# Patient Record
Sex: MALE | Race: WHITE | NOT HISPANIC OR LATINO | Employment: FULL TIME
[De-identification: names, ages, dates, MRNs, and addresses within clinical notes are randomized per-mention and may not be internally consistent; named-entity substitution may affect disease eponyms.]

---

## 2018-03-05 ENCOUNTER — TRANSCRIBE ORDERS (OUTPATIENT)
Dept: SCHEDULING | Age: 48
End: 2018-03-05

## 2018-03-05 DIAGNOSIS — M25.512 CHRONIC LEFT SHOULDER PAIN: Primary | ICD-10-CM

## 2018-03-05 DIAGNOSIS — G89.29 CHRONIC LEFT SHOULDER PAIN: Primary | ICD-10-CM

## 2018-03-09 ENCOUNTER — TELEPHONE (OUTPATIENT)
Dept: FAMILY MEDICINE | Facility: CLINIC | Age: 48
End: 2018-03-09

## 2018-03-09 RX ORDER — PENICILLIN V POTASSIUM 250 MG/1
250 TABLET, FILM COATED ORAL 3 TIMES DAILY
Qty: 30 TABLET | Refills: 0 | Status: SHIPPED | OUTPATIENT
Start: 2018-03-09 | End: 2018-03-19

## 2018-03-09 NOTE — TELEPHONE ENCOUNTER
Frederic has not been feeling well for the past couple of days. His daughter was just diagnosed with strep and he is wondering if he should begin an antibiotic as well. Could Dr. Frankel send in rx for him?

## 2019-05-28 ENCOUNTER — OFFICE VISIT (OUTPATIENT)
Dept: FAMILY MEDICINE | Facility: CLINIC | Age: 49
End: 2019-05-28
Payer: COMMERCIAL

## 2019-05-28 VITALS
WEIGHT: 171.4 LBS | OXYGEN SATURATION: 98 % | BODY MASS INDEX: 25.39 KG/M2 | HEART RATE: 66 BPM | DIASTOLIC BLOOD PRESSURE: 61 MMHG | SYSTOLIC BLOOD PRESSURE: 97 MMHG | TEMPERATURE: 98.3 F | HEIGHT: 69 IN

## 2019-05-28 DIAGNOSIS — Z11.59 MEASLES SCREENING: ICD-10-CM

## 2019-05-28 DIAGNOSIS — Z00.00 ENCOUNTER FOR PREVENTIVE CARE: Primary | ICD-10-CM

## 2019-05-28 PROCEDURE — 36415 COLL VENOUS BLD VENIPUNCTURE: CPT | Performed by: FAMILY MEDICINE

## 2019-05-28 PROCEDURE — 99396 PREV VISIT EST AGE 40-64: CPT | Mod: 25 | Performed by: FAMILY MEDICINE

## 2019-05-28 ASSESSMENT — ENCOUNTER SYMPTOMS
DIARRHEA: 0
NERVOUS/ANXIOUS: 0
ARTHRALGIAS: 0
FATIGUE: 0
BACK PAIN: 0
POLYDIPSIA: 0
HEADACHES: 0
CONSTIPATION: 0
BRUISES/BLEEDS EASILY: 0
POLYPHAGIA: 0
SHORTNESS OF BREATH: 0

## 2019-05-28 NOTE — PROGRESS NOTES
[place] [address]  [phone]       Reason for visit:   Chief Complaint   Patient presents with   • Annual Exam      HPI   Frederic Preciado is a 49 y.o. male who presents for CPE. He is well and without complaints. He continues to have hip pain.      History reviewed. No pertinent past medical history.  History reviewed. No pertinent surgical history.  Social History     Social History   • Marital status:      Spouse name: N/A   • Number of children: N/A   • Years of education: N/A     Occupational History   • Not on file.     Social History Main Topics   • Smoking status: Never Smoker   • Smokeless tobacco: Never Used   • Alcohol use Yes      Comment: socially   • Drug use: Unknown   • Sexual activity: Not on file     Other Topics Concern   • Not on file     Social History Narrative    Do you wear your seatbelt? Yes    Do you have smoke detector in your home? Yes    Do you have a carbon monoxide detector in your home? 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          Family History   Problem Relation Age of Onset   • Stroke Mother    • Atrial fibrillation Father      No known allergies  No current outpatient prescriptions on file.     No current facility-administered medications for this visit.        Patient Active Problem List    Diagnosis Date Noted   • Encounter for preventive care 05/28/2019   • Measles screening 05/28/2019         Review of Systems   Constitutional: Negative for fatigue.   HENT: Negative for hearing loss.    Eyes: Negative for visual disturbance.   Respiratory: Negative for shortness of breath.    Cardiovascular: Negative for chest pain.   Gastrointestinal: Negative for constipation and diarrhea.   Endocrine: Negative for polydipsia, polyphagia and polyuria.   Musculoskeletal: Negative for arthralgias and back pain.   Neurological: Negative for headaches.   Hematological: Does not  "bruise/bleed easily.   Psychiatric/Behavioral: The patient is not nervous/anxious.      Objective   Vitals:    05/28/19 0958   BP: 97/61   BP Location: Right upper arm   Patient Position: Sitting   Pulse: 66   Temp: 36.8 °C (98.3 °F)   TempSrc: Oral   SpO2: 98%   Weight: 77.7 kg (171 lb 6.4 oz)   Height: 1.753 m (5' 9\")     Body mass index is 25.31 kg/m².  Physical Exam   Constitutional: He is oriented to person, place, and time. He appears well-developed and well-nourished.   HENT:   Right Ear: Tympanic membrane normal.   Left Ear: Tympanic membrane normal.   Mouth/Throat: Oropharynx is clear and moist.   Eyes: Pupils are equal, round, and reactive to light. Conjunctivae are normal.   Neck: No thyromegaly present.   Cardiovascular: Normal rate, regular rhythm, normal heart sounds and intact distal pulses.    No murmur heard.  Pulmonary/Chest: Effort normal and breath sounds normal.   Abdominal: Soft. Bowel sounds are normal. There is no hepatosplenomegaly. No hernia. Hernia confirmed negative in the right inguinal area.   Genitourinary: Testes normal.   Musculoskeletal: Normal range of motion. He exhibits no edema.   Lymphadenopathy:     He has no cervical adenopathy.   Neurological: He is alert and oriented to person, place, and time. He displays normal reflexes.   Skin: Skin is warm and dry.   Psychiatric: He has a normal mood and affect. His behavior is normal. Judgment and thought content normal.       Procedures    Lab Results   Component Value Date    WBC 4.3 02/19/2018    HGB 12.8 (L) 02/19/2018    HCT 38.8 02/19/2018     02/19/2018    CHOL 187 02/19/2018    TRIG 83 02/19/2018    HDL 57 02/19/2018    LDLCALC 113 (H) 02/19/2018    ALT 13 02/19/2018    AST 16 02/19/2018     02/19/2018    K 4.4 02/19/2018     02/19/2018    CREATININE 0.94 02/19/2018    BUN 14 02/19/2018    CO2 27 02/19/2018    GLUCOSE 100 (H) 02/19/2018    HGBA1C 5.4 02/19/2018           Assessment   Problem List Items " Addressed This Visit     Encounter for preventive care - Primary     Labs  Colonoscopy with Dr Rose         Relevant Orders    CBC and Differential    Comprehensive metabolic panel    Lipid panel    PSA    Measles screening    Relevant Orders    Measles antibody, IgG              Harry A. Frankel, MD  5/28/2019

## 2019-05-29 LAB
ALBUMIN SERPL-MCNC: 4.4 G/DL (ref 3.5–5.5)
ALBUMIN/GLOB SERPL: 2.1 {RATIO} (ref 1.2–2.2)
ALP SERPL-CCNC: 59 IU/L (ref 39–117)
ALT SERPL-CCNC: 16 IU/L (ref 0–44)
AST SERPL-CCNC: 38 IU/L (ref 0–40)
BASOPHILS # BLD AUTO: 0 X10E3/UL (ref 0–0.2)
BASOPHILS NFR BLD AUTO: 1 %
BILIRUB SERPL-MCNC: 0.6 MG/DL (ref 0–1.2)
BUN SERPL-MCNC: 13 MG/DL (ref 6–24)
BUN/CREAT SERPL: 15 (ref 9–20)
CALCIUM SERPL-MCNC: 9.3 MG/DL (ref 8.7–10.2)
CHLORIDE SERPL-SCNC: 100 MMOL/L (ref 96–106)
CHOLEST SERPL-MCNC: 178 MG/DL (ref 100–199)
CO2 SERPL-SCNC: 26 MMOL/L (ref 20–29)
CREAT SERPL-MCNC: 0.87 MG/DL (ref 0.76–1.27)
EOSINOPHIL # BLD AUTO: 0.3 X10E3/UL (ref 0–0.4)
EOSINOPHIL NFR BLD AUTO: 8 %
ERYTHROCYTE [DISTWIDTH] IN BLOOD BY AUTOMATED COUNT: 14.6 % (ref 12.3–15.4)
GLOBULIN SER CALC-MCNC: 2.1 G/DL (ref 1.5–4.5)
GLUCOSE SERPL-MCNC: 107 MG/DL (ref 65–99)
HCT VFR BLD AUTO: 37.4 % (ref 37.5–51)
HDLC SERPL-MCNC: 56 MG/DL
HGB BLD-MCNC: 12.3 G/DL (ref 13–17.7)
IMM GRANULOCYTES # BLD AUTO: 0 X10E3/UL (ref 0–0.1)
IMM GRANULOCYTES NFR BLD AUTO: 0 %
LAB CORP EGFR IF AFRICN AM: 116 ML/MIN/1.73
LAB CORP EGFR IF NONAFRICN AM: 101 ML/MIN/1.73
LDLC SERPL CALC-MCNC: 102 MG/DL (ref 0–99)
LYMPHOCYTES # BLD AUTO: 1.6 X10E3/UL (ref 0.7–3.1)
LYMPHOCYTES NFR BLD AUTO: 38 %
MCH RBC QN AUTO: 28.6 PG (ref 26.6–33)
MCHC RBC AUTO-ENTMCNC: 32.9 G/DL (ref 31.5–35.7)
MCV RBC AUTO: 87 FL (ref 79–97)
MEV IGG SER IA-ACNC: >300 AU/ML
MONOCYTES # BLD AUTO: 0.3 X10E3/UL (ref 0.1–0.9)
MONOCYTES NFR BLD AUTO: 7 %
NEUTROPHILS # BLD AUTO: 1.9 X10E3/UL (ref 1.4–7)
NEUTROPHILS NFR BLD AUTO: 46 %
PLATELET # BLD AUTO: 210 X10E3/UL (ref 150–450)
POTASSIUM SERPL-SCNC: 4.2 MMOL/L (ref 3.5–5.2)
PROT SERPL-MCNC: 6.5 G/DL (ref 6–8.5)
PSA SERPL-MCNC: 0.9 NG/ML (ref 0–4)
RBC # BLD AUTO: 4.3 X10E6/UL (ref 4.14–5.8)
SODIUM SERPL-SCNC: 138 MMOL/L (ref 134–144)
TRIGL SERPL-MCNC: 102 MG/DL (ref 0–149)
VLDLC SERPL CALC-MCNC: 20 MG/DL (ref 5–40)
WBC # BLD AUTO: 4.2 X10E3/UL (ref 3.4–10.8)

## 2019-10-14 ENCOUNTER — TELEPHONE (OUTPATIENT)
Dept: FAMILY MEDICINE | Facility: CLINIC | Age: 49
End: 2019-10-14

## 2019-10-14 RX ORDER — METHYLPREDNISOLONE 4 MG/1
TABLET ORAL
Qty: 21 TABLET | Refills: 0 | Status: SHIPPED | OUTPATIENT
Start: 2019-10-14 | End: 2019-10-21

## 2019-10-14 NOTE — TELEPHONE ENCOUNTER
Threw his back out (he has done that before.) He is leaving for a business trip tomorrow and was hoping you could call in a medrol dose zahra. Let me know when done I can call him.

## 2020-12-11 ENCOUNTER — OFFICE VISIT (OUTPATIENT)
Dept: FAMILY MEDICINE | Facility: CLINIC | Age: 50
End: 2020-12-11
Payer: COMMERCIAL

## 2020-12-11 VITALS
TEMPERATURE: 96.2 F | WEIGHT: 170.6 LBS | HEART RATE: 65 BPM | OXYGEN SATURATION: 98 % | BODY MASS INDEX: 25.27 KG/M2 | SYSTOLIC BLOOD PRESSURE: 127 MMHG | DIASTOLIC BLOOD PRESSURE: 68 MMHG | HEIGHT: 69 IN

## 2020-12-11 DIAGNOSIS — Z00.00 ENCOUNTER FOR PREVENTIVE CARE: Primary | ICD-10-CM

## 2020-12-11 PROCEDURE — 36415 COLL VENOUS BLD VENIPUNCTURE: CPT | Performed by: FAMILY MEDICINE

## 2020-12-11 PROCEDURE — 99396 PREV VISIT EST AGE 40-64: CPT | Mod: 25 | Performed by: FAMILY MEDICINE

## 2020-12-11 ASSESSMENT — ENCOUNTER SYMPTOMS
POLYPHAGIA: 0
FATIGUE: 0
POLYDIPSIA: 0
CONSTIPATION: 0
NERVOUS/ANXIOUS: 0
HEADACHES: 0
BRUISES/BLEEDS EASILY: 0
BACK PAIN: 0
DIARRHEA: 0
SHORTNESS OF BREATH: 0
ARTHRALGIAS: 0

## 2020-12-11 NOTE — PROGRESS NOTES
"   [place] [address]  [phone]       Reason for visit:   Chief Complaint   Patient presents with   • Annual Exam      HPI   Frederic Preciado is a 50 y.o. male who presents with CPE. He is well. He exercises and eats healthy.      History reviewed. No pertinent past medical history.  History reviewed. No pertinent surgical history.  Social History     Tobacco Use   • Smoking status: Never Smoker   • Smokeless tobacco: Never Used   Substance Use Topics   • Alcohol use: Yes     Comment: socially   • Drug use: Not on file      Family History   Problem Relation Age of Onset   • Stroke Biological Mother    • Atrial fibrillation Biological Father      No known allergies  No current outpatient medications on file.     No current facility-administered medications for this visit.        Patient Active Problem List    Diagnosis Date Noted   • Encounter for preventive care 05/28/2019   • Measles screening 05/28/2019         Review of Systems   Constitutional: Negative for fatigue.   HENT: Negative for hearing loss.    Eyes: Negative for visual disturbance.   Respiratory: Negative for shortness of breath.    Cardiovascular: Negative for chest pain.   Gastrointestinal: Negative for constipation and diarrhea.   Endocrine: Negative for polydipsia, polyphagia and polyuria.   Musculoskeletal: Negative for arthralgias and back pain.   Skin: Negative for rash.   Neurological: Negative for headaches.   Hematological: Does not bruise/bleed easily.   Psychiatric/Behavioral: The patient is not nervous/anxious.      Objective   Vitals:    12/11/20 0832   BP: 127/68   BP Location: Left upper arm   Patient Position: Sitting   Pulse: 65   Temp: (!) 35.7 °C (96.2 °F)   TempSrc: Temporal   SpO2: 98%   Weight: 77.4 kg (170 lb 9.6 oz)   Height: 1.753 m (5' 9\")     Body mass index is 25.19 kg/m².  Physical Exam  Constitutional:       Appearance: Normal appearance.   HENT: "      Right Ear: Tympanic membrane and ear canal normal.      Left Ear: Tympanic membrane and ear canal normal.   Eyes:      Conjunctiva/sclera: Conjunctivae normal.      Pupils: Pupils are equal, round, and reactive to light.   Neck:      Musculoskeletal: Normal range of motion.      Thyroid: No thyromegaly.      Vascular: No carotid bruit.   Cardiovascular:      Rate and Rhythm: Normal rate and regular rhythm.      Pulses: Normal pulses.      Heart sounds: Normal heart sounds.   Pulmonary:      Effort: Pulmonary effort is normal.      Breath sounds: Normal breath sounds.   Abdominal:      General: Abdomen is flat. Bowel sounds are normal.      Palpations: Abdomen is soft. There is no hepatomegaly or splenomegaly.      Hernia: There is no hernia in the left inguinal area or right inguinal area.   Genitourinary:     Scrotum/Testes: Normal.   Musculoskeletal: Normal range of motion.      Right lower leg: No edema.      Left lower leg: No edema.   Lymphadenopathy:      Cervical: No cervical adenopathy.   Skin:     General: Skin is warm and dry.   Neurological:      General: No focal deficit present.      Mental Status: He is alert and oriented to person, place, and time.   Psychiatric:         Mood and Affect: Mood normal.         Behavior: Behavior normal.         Thought Content: Thought content normal.         Judgment: Judgment normal.         Procedures    Lab Results   Component Value Date    WBC 4.2 05/28/2019    HGB 12.3 (L) 05/28/2019    HCT 37.4 (L) 05/28/2019     05/28/2019    CHOL 178 05/28/2019    TRIG 102 05/28/2019    HDL 56 05/28/2019    LDLCALC 102 (H) 05/28/2019    ALT 16 05/28/2019    AST 38 05/28/2019     05/28/2019    K 4.2 05/28/2019     05/28/2019    CREATININE 0.87 05/28/2019    BUN 13 05/28/2019    CO2 26 05/28/2019    PSA 0.9 05/28/2019    GLUCOSE 107 (H) 05/28/2019    HGBA1C 5.4 02/19/2018           Assessment   Problem List Items Addressed This Visit        Other     Encounter for preventive care - Primary     Labs  RTO for Shingrix         Relevant Orders    CBC and Differential    Comprehensive metabolic panel    Lipid panel    PSA              Harry A. Frankel, MD  12/11/2020

## 2020-12-12 LAB
ALBUMIN SERPL-MCNC: 4.6 G/DL (ref 3.8–4.9)
ALBUMIN/GLOB SERPL: 2.3 {RATIO} (ref 1.2–2.2)
ALP SERPL-CCNC: 65 IU/L (ref 39–117)
ALT SERPL-CCNC: 13 IU/L (ref 0–44)
AST SERPL-CCNC: 19 IU/L (ref 0–40)
BASOPHILS # BLD AUTO: 0.1 X10E3/UL (ref 0–0.2)
BASOPHILS NFR BLD AUTO: 1 %
BILIRUB SERPL-MCNC: 0.4 MG/DL (ref 0–1.2)
BUN SERPL-MCNC: 21 MG/DL (ref 6–24)
BUN/CREAT SERPL: 24 (ref 9–20)
CALCIUM SERPL-MCNC: 9.3 MG/DL (ref 8.7–10.2)
CHLORIDE SERPL-SCNC: 100 MMOL/L (ref 96–106)
CHOLEST SERPL-MCNC: 182 MG/DL (ref 100–199)
CO2 SERPL-SCNC: 27 MMOL/L (ref 20–29)
CREAT SERPL-MCNC: 0.89 MG/DL (ref 0.76–1.27)
EOSINOPHIL # BLD AUTO: 0.3 X10E3/UL (ref 0–0.4)
EOSINOPHIL NFR BLD AUTO: 7 %
ERYTHROCYTE [DISTWIDTH] IN BLOOD BY AUTOMATED COUNT: 13.2 % (ref 11.6–15.4)
GLOBULIN SER CALC-MCNC: 2 G/DL (ref 1.5–4.5)
GLUCOSE SERPL-MCNC: 98 MG/DL (ref 65–99)
HCT VFR BLD AUTO: 39.1 % (ref 37.5–51)
HDLC SERPL-MCNC: 50 MG/DL
HGB BLD-MCNC: 13.1 G/DL (ref 13–17.7)
IMM GRANULOCYTES # BLD AUTO: 0 X10E3/UL (ref 0–0.1)
IMM GRANULOCYTES NFR BLD AUTO: 0 %
LAB CORP EGFR IF AFRICN AM: 114 ML/MIN/1.73
LAB CORP EGFR IF NONAFRICN AM: 99 ML/MIN/1.73
LDLC SERPL CALC-MCNC: 116 MG/DL (ref 0–99)
LYMPHOCYTES # BLD AUTO: 1.6 X10E3/UL (ref 0.7–3.1)
LYMPHOCYTES NFR BLD AUTO: 34 %
MCH RBC QN AUTO: 29.3 PG (ref 26.6–33)
MCHC RBC AUTO-ENTMCNC: 33.5 G/DL (ref 31.5–35.7)
MCV RBC AUTO: 88 FL (ref 79–97)
MONOCYTES # BLD AUTO: 0.4 X10E3/UL (ref 0.1–0.9)
MONOCYTES NFR BLD AUTO: 8 %
NEUTROPHILS # BLD AUTO: 2.5 X10E3/UL (ref 1.4–7)
NEUTROPHILS NFR BLD AUTO: 50 %
PLATELET # BLD AUTO: 215 X10E3/UL (ref 150–450)
POTASSIUM SERPL-SCNC: 4.8 MMOL/L (ref 3.5–5.2)
PROT SERPL-MCNC: 6.6 G/DL (ref 6–8.5)
PSA SERPL-MCNC: 0.9 NG/ML (ref 0–4)
RBC # BLD AUTO: 4.47 X10E6/UL (ref 4.14–5.8)
SODIUM SERPL-SCNC: 141 MMOL/L (ref 134–144)
TRIGL SERPL-MCNC: 88 MG/DL (ref 0–149)
VLDLC SERPL CALC-MCNC: 16 MG/DL (ref 5–40)
WBC # BLD AUTO: 4.9 X10E3/UL (ref 3.4–10.8)

## 2020-12-15 ENCOUNTER — TELEPHONE (OUTPATIENT)
Dept: FAMILY MEDICINE | Facility: CLINIC | Age: 50
End: 2020-12-15

## 2020-12-29 ENCOUNTER — TELEPHONE (OUTPATIENT)
Dept: FAMILY MEDICINE | Facility: CLINIC | Age: 50
End: 2020-12-29

## 2020-12-29 ENCOUNTER — CLINICAL SUPPORT (OUTPATIENT)
Dept: FAMILY MEDICINE | Facility: CLINIC | Age: 50
End: 2020-12-29
Payer: COMMERCIAL

## 2020-12-29 DIAGNOSIS — Z23 NEED FOR SHINGLES VACCINE: Primary | ICD-10-CM

## 2020-12-29 DIAGNOSIS — Z23 NEED FOR SHINGLES VACCINE: ICD-10-CM

## 2020-12-29 PROCEDURE — 90471 IMMUNIZATION ADMIN: CPT | Performed by: NURSE PRACTITIONER

## 2020-12-29 PROCEDURE — 90750 HZV VACC RECOMBINANT IM: CPT | Performed by: NURSE PRACTITIONER

## 2020-12-29 NOTE — TELEPHONE ENCOUNTER
Pt is scheduled to come in today for their Shingrix vaccine. Can you please place the order? Thanks!

## 2021-03-05 ENCOUNTER — TELEPHONE (OUTPATIENT)
Dept: FAMILY MEDICINE | Facility: CLINIC | Age: 51
End: 2021-03-05

## 2021-03-05 DIAGNOSIS — Z23 NEED FOR SHINGLES VACCINE: Primary | ICD-10-CM

## 2021-03-09 ENCOUNTER — OFFICE VISIT (OUTPATIENT)
Dept: FAMILY MEDICINE | Facility: CLINIC | Age: 51
End: 2021-03-09
Payer: COMMERCIAL

## 2021-03-09 VITALS
WEIGHT: 173.2 LBS | HEIGHT: 69 IN | SYSTOLIC BLOOD PRESSURE: 128 MMHG | BODY MASS INDEX: 25.65 KG/M2 | TEMPERATURE: 97.5 F | OXYGEN SATURATION: 99 % | DIASTOLIC BLOOD PRESSURE: 77 MMHG | HEART RATE: 66 BPM

## 2021-03-09 DIAGNOSIS — H93.13 TINNITUS OF BOTH EARS: Primary | ICD-10-CM

## 2021-03-09 PROCEDURE — 99213 OFFICE O/P EST LOW 20 MIN: CPT | Mod: 25 | Performed by: FAMILY MEDICINE

## 2021-03-09 PROCEDURE — 90750 HZV VACC RECOMBINANT IM: CPT | Performed by: FAMILY MEDICINE

## 2021-03-09 PROCEDURE — 90471 IMMUNIZATION ADMIN: CPT | Performed by: FAMILY MEDICINE

## 2021-03-09 RX ORDER — NAPROXEN SODIUM 220 MG
TABLET ORAL AS NEEDED
COMMUNITY
End: 2023-03-29

## 2021-03-09 ASSESSMENT — ENCOUNTER SYMPTOMS: HEADACHES: 1

## 2021-03-09 NOTE — PROGRESS NOTES
"   [place] [address]  [phone]       Reason for visit:   Chief Complaint   Patient presents with   • Ringing in both ears      HPI   Frederic Preciado is a 51 y.o. male who presents with ringing in ears. Both ears but L>R. Occurred in left ear over [place]. Occurred again 6 weeks and has lasted.      History reviewed. No pertinent past medical history.  History reviewed. No pertinent surgical history.  Social History     Tobacco Use   • Smoking status: Never Smoker   • Smokeless tobacco: Never Used   Substance Use Topics   • Alcohol use: Yes     Comment: socially   • Drug use: Not on file      Family History   Problem Relation Age of Onset   • Stroke Biological Mother    • Atrial fibrillation Biological Father      No known allergies  Current Outpatient Medications   Medication Sig Dispense Refill   • naproxen sodium (ALEVE) 220 mg tablet as needed.       No current facility-administered medications for this visit.        Patient Active Problem List    Diagnosis Date Noted   • Tinnitus of both ears 03/09/2021   • Encounter for preventive care 05/28/2019   • Measles screening 05/28/2019         Review of Systems   HENT: Positive for tinnitus. Negative for ear discharge and ear pain.    Neurological: Positive for headaches.     Objective   Vitals:    03/09/21 1136   BP: 128/77   BP Location: Left upper arm   Patient Position: Sitting   Pulse: 66   Temp: 36.4 °C (97.5 °F)   TempSrc: Temporal   SpO2: 99%   Weight: 78.6 kg (173 lb 3.2 oz)   Height: 1.753 m (5' 9\")     Body mass index is 25.58 kg/m².  Physical Exam  HENT:      Right Ear: Tympanic membrane and ear canal normal.      Left Ear: Tympanic membrane and ear canal normal.         Procedures    Lab Results   Component Value Date    WBC 4.9 12/11/2020    HGB 13.1 12/11/2020    HCT 39.1 12/11/2020     12/11/2020    CHOL 182 12/11/2020    TRIG 88 12/11/2020    HDL 50 12/11/2020    " LDLCALC 116 (H) 12/11/2020    ALT 13 12/11/2020    AST 19 12/11/2020     12/11/2020    K 4.8 12/11/2020     12/11/2020    CREATININE 0.89 12/11/2020    BUN 21 12/11/2020    CO2 27 12/11/2020    PSA 0.9 12/11/2020    GLUCOSE 98 12/11/2020    HGBA1C 5.4 02/19/2018           Assessment   Problem List Items Addressed This Visit        Nervous    Tinnitus of both ears - Primary     Referred to Dr Swanson Harry A. Frankel, MD  3/9/2021

## 2021-03-17 ENCOUNTER — TRANSCRIBE ORDERS (OUTPATIENT)
Dept: SCHEDULING | Age: 51
End: 2021-03-17

## 2021-03-17 DIAGNOSIS — H90.5 UNSPECIFIED SENSORINEURAL HEARING LOSS: Primary | ICD-10-CM

## 2021-03-19 ENCOUNTER — HOSPITAL ENCOUNTER (OUTPATIENT)
Dept: RADIOLOGY | Age: 51
Discharge: HOME | End: 2021-03-19
Attending: OTOLARYNGOLOGY
Payer: COMMERCIAL

## 2021-03-19 DIAGNOSIS — H90.5 UNSPECIFIED SENSORINEURAL HEARING LOSS: ICD-10-CM

## 2021-03-19 RX ORDER — GADOBUTROL 604.72 MG/ML
8 INJECTION INTRAVENOUS ONCE
Status: COMPLETED | OUTPATIENT
Start: 2021-03-19 | End: 2021-03-19

## 2021-03-19 RX ADMIN — GADOBUTROL 8 ML: 604.72 INJECTION INTRAVENOUS at 10:00

## 2021-03-24 ENCOUNTER — TELEPHONE (OUTPATIENT)
Dept: FAMILY MEDICINE | Facility: CLINIC | Age: 51
End: 2021-03-24

## 2021-03-24 RX ORDER — METHYLPREDNISOLONE 4 MG/1
TABLET ORAL
Qty: 21 TABLET | Refills: 0 | Status: SHIPPED | OUTPATIENT
Start: 2021-03-24 | End: 2021-03-31

## 2021-03-24 NOTE — TELEPHONE ENCOUNTER
Frederic called. he's having issues again with his Upper neck disk. He was seen before for this issue and you called in a script for a Medrol Dosepack. Could you please send to the  [facility] in [place] at [address]?

## 2021-04-15 DIAGNOSIS — Z23 ENCOUNTER FOR IMMUNIZATION: ICD-10-CM

## 2021-10-11 ENCOUNTER — TRANSCRIBE ORDERS (OUTPATIENT)
Dept: SCHEDULING | Age: 51
End: 2021-10-11
Payer: COMMERCIAL

## 2021-10-11 DIAGNOSIS — M51.26 OTHER INTERVERTEBRAL DISC DISPLACEMENT, LUMBAR REGION: Primary | ICD-10-CM

## 2021-10-15 ENCOUNTER — HOSPITAL ENCOUNTER (OUTPATIENT)
Dept: RADIOLOGY | Age: 51
Discharge: HOME | End: 2021-10-15
Attending: PHYSICAL MEDICINE & REHABILITATION
Payer: COMMERCIAL

## 2021-10-15 DIAGNOSIS — M51.26 OTHER INTERVERTEBRAL DISC DISPLACEMENT, LUMBAR REGION: ICD-10-CM

## 2021-11-26 ENCOUNTER — TELEPHONE (OUTPATIENT)
Dept: FAMILY MEDICINE | Facility: CLINIC | Age: 51
End: 2021-11-26

## 2021-11-26 NOTE — TELEPHONE ENCOUNTER
Pt called. States that his daughter got diagnosed with the flu on Monday. He took her today to the pediatrician for her lingering cough and they prescribed her tamifu.     He was told to call you to see if him , his wife sheeba, and his daughter milvia who are all patiens here have to go on tamiflu as well. Neither of them have symptoms as of now

## 2021-12-21 ENCOUNTER — OFFICE VISIT (OUTPATIENT)
Dept: FAMILY MEDICINE | Facility: CLINIC | Age: 51
End: 2021-12-21
Payer: COMMERCIAL

## 2021-12-21 VITALS
OXYGEN SATURATION: 98 % | BODY MASS INDEX: 25.62 KG/M2 | TEMPERATURE: 98.9 F | WEIGHT: 173 LBS | HEIGHT: 69 IN | DIASTOLIC BLOOD PRESSURE: 72 MMHG | HEART RATE: 66 BPM | SYSTOLIC BLOOD PRESSURE: 115 MMHG

## 2021-12-21 DIAGNOSIS — Z00.00 ENCOUNTER FOR PREVENTIVE CARE: Primary | ICD-10-CM

## 2021-12-21 DIAGNOSIS — M48.061 SPINAL STENOSIS OF LUMBAR REGION WITHOUT NEUROGENIC CLAUDICATION: ICD-10-CM

## 2021-12-21 PROCEDURE — 36415 COLL VENOUS BLD VENIPUNCTURE: CPT | Performed by: FAMILY MEDICINE

## 2021-12-21 PROCEDURE — 99396 PREV VISIT EST AGE 40-64: CPT | Mod: 25 | Performed by: FAMILY MEDICINE

## 2021-12-21 PROCEDURE — 3008F BODY MASS INDEX DOCD: CPT | Performed by: FAMILY MEDICINE

## 2021-12-21 RX ORDER — GABAPENTIN 300 MG/1
CAPSULE ORAL
COMMUNITY
Start: 2021-12-19 | End: 2023-03-29

## 2021-12-21 ASSESSMENT — ENCOUNTER SYMPTOMS
SHORTNESS OF BREATH: 0
ARTHRALGIAS: 0
BLOOD IN STOOL: 0
POLYPHAGIA: 0
DIARRHEA: 0
HEADACHES: 0
NERVOUS/ANXIOUS: 0
POLYDIPSIA: 0
FATIGUE: 0
BACK PAIN: 1
CONSTIPATION: 0

## 2021-12-21 NOTE — PROGRESS NOTES
[address] [address]  [phone]       Reason for visit:   Chief Complaint   Patient presents with   • Annual Exam      HPI   Frederic Preciado is a 51 y.o. male who presents with CPE. He has lumbar stenosis and radiculopathy and he is seen at [facility]. He is compliant with gabapentin.      History reviewed. No pertinent past medical history.  History reviewed. No pertinent surgical history.  Social History     Tobacco Use   • Smoking status: Never Smoker   • Smokeless tobacco: Never Used   Substance Use Topics   • Alcohol use: Yes     Comment: socially   • Drug use: Not on file      Family History   Problem Relation Age of Onset   • Stroke Biological Mother    • Atrial fibrillation Biological Father      No known allergies  Current Outpatient Medications   Medication Sig Dispense Refill   • gabapentin 300 mg capsule      • naproxen sodium (ALEVE) 220 mg tablet as needed.       No current facility-administered medications for this visit.       Patient Active Problem List    Diagnosis Date Noted   • Spinal stenosis of lumbar region without neurogenic claudication 12/21/2021   • Tinnitus of both ears 03/09/2021   • Encounter for preventive care 05/28/2019   • Measles screening 05/28/2019         Review of Systems   Constitutional: Negative for fatigue.   HENT: Negative for hearing loss.    Eyes: Negative for visual disturbance.   Respiratory: Negative for shortness of breath.    Cardiovascular: Negative for chest pain.   Gastrointestinal: Negative for blood in stool, constipation and diarrhea.   Endocrine: Negative for polydipsia, polyphagia and polyuria.   Musculoskeletal: Positive for back pain. Negative for arthralgias.   Skin: Negative for rash.   Neurological: Negative for headaches.   Psychiatric/Behavioral: The patient is not nervous/anxious.      Objective   Vitals:    12/21/21 0839   BP: 115/72   BP Location: Left upper arm  "  Patient Position: Sitting   Pulse: 66   Temp: 37.2 °C (98.9 °F)   TempSrc: Oral   SpO2: 98%   Weight: 78.5 kg (173 lb)   Height: 1.753 m (5' 9\")     Body mass index is 25.55 kg/m².  Physical Exam  Constitutional:       Appearance: Normal appearance.   HENT:      Right Ear: Tympanic membrane and ear canal normal.      Left Ear: Tympanic membrane and ear canal normal.   Eyes:      Extraocular Movements: Extraocular movements intact.      Conjunctiva/sclera: Conjunctivae normal.      Pupils: Pupils are equal, round, and reactive to light.   Neck:      Thyroid: No thyromegaly.      Vascular: No carotid bruit.   Cardiovascular:      Rate and Rhythm: Normal rate and regular rhythm.      Pulses: Normal pulses.      Heart sounds: Normal heart sounds.   Pulmonary:      Effort: Pulmonary effort is normal.      Breath sounds: Normal breath sounds.   Abdominal:      General: Abdomen is flat. Bowel sounds are normal.      Palpations: Abdomen is soft. There is no hepatomegaly or splenomegaly.   Musculoskeletal:         General: Normal range of motion.      Cervical back: Normal range of motion.      Right lower leg: No edema.      Left lower leg: No edema.   Lymphadenopathy:      Cervical: No cervical adenopathy.   Skin:     General: Skin is warm and dry.   Neurological:      General: No focal deficit present.      Mental Status: He is alert and oriented to person, place, and time.   Psychiatric:         Mood and Affect: Mood normal.         Behavior: Behavior normal.         Thought Content: Thought content normal.         Judgment: Judgment normal.         Procedures    Lab Results   Component Value Date    WBC 4.9 12/11/2020    HGB 13.1 12/11/2020    HCT 39.1 12/11/2020     12/11/2020    CHOL 182 12/11/2020    TRIG 88 12/11/2020    HDL 50 12/11/2020    LDLCALC 116 (H) 12/11/2020    ALT 13 12/11/2020    AST 19 12/11/2020     12/11/2020    K 4.8 12/11/2020     12/11/2020    CREATININE 0.89 12/11/2020    BUN " 21 12/11/2020    CO2 27 12/11/2020    PSA 0.9 12/11/2020    GLUCOSE 98 12/11/2020    HGBA1C 5.4 02/19/2018           Assessment   Problem List Items Addressed This Visit        Other    Encounter for preventive care - Primary     Labs  Other preventive services are up to date           Relevant Orders    CBC and Differential    Comprehensive metabolic panel    Lipid panel    PSA    Spinal stenosis of lumbar region without neurogenic claudication     Follow with Dr Lerman Harry A. Frankel, MD  12/21/2021                    None

## 2021-12-22 LAB
ALBUMIN SERPL-MCNC: 4.5 G/DL (ref 3.8–4.9)
ALBUMIN/GLOB SERPL: 2 {RATIO} (ref 1.2–2.2)
ALP SERPL-CCNC: 57 IU/L (ref 44–121)
ALT SERPL-CCNC: 15 IU/L (ref 0–44)
AST SERPL-CCNC: 14 IU/L (ref 0–40)
BASOPHILS # BLD AUTO: 0 X10E3/UL (ref 0–0.2)
BASOPHILS NFR BLD AUTO: 1 %
BILIRUB SERPL-MCNC: 0.5 MG/DL (ref 0–1.2)
BUN SERPL-MCNC: 18 MG/DL (ref 6–24)
BUN/CREAT SERPL: 19 (ref 9–20)
CALCIUM SERPL-MCNC: 9.2 MG/DL (ref 8.7–10.2)
CHLORIDE SERPL-SCNC: 103 MMOL/L (ref 96–106)
CHOLEST SERPL-MCNC: 202 MG/DL (ref 100–199)
CO2 SERPL-SCNC: 27 MMOL/L (ref 20–29)
CREAT SERPL-MCNC: 0.95 MG/DL (ref 0.76–1.27)
EOSINOPHIL # BLD AUTO: 0.1 X10E3/UL (ref 0–0.4)
EOSINOPHIL NFR BLD AUTO: 1 %
ERYTHROCYTE [DISTWIDTH] IN BLOOD BY AUTOMATED COUNT: 13.1 % (ref 11.6–15.4)
GLOBULIN SER CALC-MCNC: 2.2 G/DL (ref 1.5–4.5)
GLUCOSE SERPL-MCNC: 107 MG/DL (ref 65–99)
HCT VFR BLD AUTO: 39.7 % (ref 37.5–51)
HDLC SERPL-MCNC: 58 MG/DL
HGB BLD-MCNC: 13.2 G/DL (ref 13–17.7)
IMM GRANULOCYTES # BLD AUTO: 0 X10E3/UL (ref 0–0.1)
IMM GRANULOCYTES NFR BLD AUTO: 0 %
LAB CORP EGFR IF AFRICN AM: 106 ML/MIN/1.73
LAB CORP EGFR IF NONAFRICN AM: 92 ML/MIN/1.73
LDLC SERPL CALC-MCNC: 128 MG/DL (ref 0–99)
LYMPHOCYTES # BLD AUTO: 1.9 X10E3/UL (ref 0.7–3.1)
LYMPHOCYTES NFR BLD AUTO: 34 %
MCH RBC QN AUTO: 29.4 PG (ref 26.6–33)
MCHC RBC AUTO-ENTMCNC: 33.2 G/DL (ref 31.5–35.7)
MCV RBC AUTO: 88 FL (ref 79–97)
MONOCYTES # BLD AUTO: 0.4 X10E3/UL (ref 0.1–0.9)
MONOCYTES NFR BLD AUTO: 7 %
NEUTROPHILS # BLD AUTO: 3.2 X10E3/UL (ref 1.4–7)
NEUTROPHILS NFR BLD AUTO: 57 %
PLATELET # BLD AUTO: 224 X10E3/UL (ref 150–450)
POTASSIUM SERPL-SCNC: 4.4 MMOL/L (ref 3.5–5.2)
PROT SERPL-MCNC: 6.7 G/DL (ref 6–8.5)
PSA SERPL-MCNC: 1 NG/ML (ref 0–4)
RBC # BLD AUTO: 4.49 X10E6/UL (ref 4.14–5.8)
SODIUM SERPL-SCNC: 141 MMOL/L (ref 134–144)
TRIGL SERPL-MCNC: 88 MG/DL (ref 0–149)
VLDLC SERPL CALC-MCNC: 16 MG/DL (ref 5–40)
WBC # BLD AUTO: 5.5 X10E3/UL (ref 3.4–10.8)

## 2022-05-04 ENCOUNTER — OFFICE VISIT (OUTPATIENT)
Dept: FAMILY MEDICINE | Facility: CLINIC | Age: 52
End: 2022-05-04
Payer: COMMERCIAL

## 2022-05-04 VITALS
HEART RATE: 64 BPM | WEIGHT: 175.2 LBS | DIASTOLIC BLOOD PRESSURE: 71 MMHG | OXYGEN SATURATION: 98 % | BODY MASS INDEX: 25.95 KG/M2 | TEMPERATURE: 97.4 F | SYSTOLIC BLOOD PRESSURE: 119 MMHG | HEIGHT: 69 IN

## 2022-05-04 DIAGNOSIS — S39.91XA INJURY OF GROIN, INITIAL ENCOUNTER: Primary | ICD-10-CM

## 2022-05-04 PROCEDURE — 3008F BODY MASS INDEX DOCD: CPT | Performed by: FAMILY MEDICINE

## 2022-05-04 PROCEDURE — 99213 OFFICE O/P EST LOW 20 MIN: CPT | Performed by: FAMILY MEDICINE

## 2022-05-04 RX ORDER — CETIRIZINE HYDROCHLORIDE 10 MG/1
10 TABLET ORAL DAILY
COMMUNITY
End: 2023-03-29

## 2022-05-04 NOTE — PROGRESS NOTES
"   [place] [address]  [phone]       Reason for visit:   Chief Complaint   Patient presents with   • Illness      HPI   Frederic Preciado is a 52 y.o. male who presents with right hip and groin pain. 2 months. Concerned with hernia. Pain level is 7/10 at worse.      History reviewed. No pertinent past medical history.  History reviewed. No pertinent surgical history.  Social History     Tobacco Use   • Smoking status: Never Smoker   • Smokeless tobacco: Never Used   Substance Use Topics   • Alcohol use: Yes     Comment: socially      Family History   Problem Relation Age of Onset   • Stroke Biological Mother    • Atrial fibrillation Biological Father      No known allergies  Current Outpatient Medications   Medication Sig Dispense Refill   • cetirizine (ZyrTEC) 10 mg tablet Take 10 mg by mouth daily.     • gabapentin 300 mg capsule      • naproxen sodium (ALEVE) 220 mg tablet as needed.       No current facility-administered medications for this visit.       Patient Active Problem List    Diagnosis Date Noted   • Groin injury 05/04/2022   • Spinal stenosis of lumbar region without neurogenic claudication 12/21/2021   • Tinnitus of both ears 03/09/2021   • Encounter for preventive care 05/28/2019   • Measles screening 05/28/2019         Review of Systems   Musculoskeletal:        Groin pain     Objective   Vitals:    05/04/22 1024   BP: 119/71   BP Location: Right upper arm   Patient Position: Sitting   Pulse: 64   Temp: 36.3 °C (97.4 °F)   TempSrc: Temporal   SpO2: 98%   Weight: 79.5 kg (175 lb 3.2 oz)   Height: 1.753 m (5' 9\")     Body mass index is 25.87 kg/m².  Physical Exam  Abdominal:      Hernia: There is no hernia in the left inguinal area or right inguinal area.   Genitourinary:     Testes: Normal.   Musculoskeletal:      Comments: Full rom of hip with tenderness         Procedures    Lab Results   Component Value Date    WBC 5.5 " 12/21/2021    HGB 13.2 12/21/2021    HCT 39.7 12/21/2021     12/21/2021    CHOL 202 (H) 12/21/2021    TRIG 88 12/21/2021    HDL 58 12/21/2021    LDLCALC 128 (H) 12/21/2021    ALT 15 12/21/2021    AST 14 12/21/2021     12/21/2021    K 4.4 12/21/2021     12/21/2021    CREATININE 0.95 12/21/2021    BUN 18 12/21/2021    CO2 27 12/21/2021    PSA 1.0 12/21/2021    GLUCOSE 107 (H) 12/21/2021    HGBA1C 5.4 02/19/2018           Assessment   Problem List Items Addressed This Visit        Musculoskeletal    Groin injury - Primary     Physical therapy           Relevant Orders    Ambulatory referral to Physical Therapy              Harry A. Frankel, MD  5/4/2022

## 2022-07-16 ENCOUNTER — NURSE TRIAGE (OUTPATIENT)
Dept: FAMILY MEDICINE | Facility: CLINIC | Age: 52
End: 2022-07-16
Payer: COMMERCIAL

## 2022-07-16 NOTE — TELEPHONE ENCOUNTER
"    Reason for Disposition  • [1] COVID-19 diagnosed by positive lab test (e.g., PCR, rapid self-test kit) AND [2] mild symptoms (e.g., cough, fever, others) AND [3] no complications or SOB    Answer Assessment - Initial Assessment Questions  1. COVID-19 DIAGNOSIS: \"Who made your COVID-19 diagnosis?\" \"Was it confirmed by a positive lab test or self-test?\" If not diagnosed by a doctor (or NP/PA), ask \"Are there lots of cases (community spread) where you live?\" Note: See public health department website, if unsure.      COVID positive at home  2. COVID-19 EXPOSURE: \"Was there any known exposure to COVID before the symptoms began?\" CDC Definition of close contact: within 6 feet (2 meters) for a total of 15 minutes or more over a 24-hour period.       no  3. ONSET: When did the COVID-19 symptoms start?\"       Last night  4. WORST SYMPTOM: \"What is your worst symptom?\" (e.g., cough, fever, shortness of breath, muscle aches)     Post nasal drip  5. COUGH: \"Do you have a cough?\" If Yes, ask: \"How bad is the cough?\"        mild  6. FEVER: \"Do you have a fever?\" If Yes, ask: \"What is your temperature, how was it measured, and when did it start?\"      no  7. RESPIRATORY STATUS: \"Describe your breathing?\" (e.g., shortness of breath, wheezing, unable to speak)       normal  8. BETTER-SAME-WORSE: \"Are you getting better, staying the same or getting worse compared to yesterday?\"  If getting worse, ask, \"In what way?\"      worse  9. HIGH RISK DISEASE: \"Do you have any chronic medical problems?\" (e.g., asthma, heart or lung disease, weak immune system, obesity, etc.)      no  10. VACCINE: \"Have you had the COVID-19 vaccine?\" If Yes, ask: \"Which one, how many shots, when did you get it?\"        yes  11. BOOSTER: \"Have you received your COVID-19 booster?\" If Yes, ask: \"Which one and when did you get it?\"        yes  12. PREGNANCY: \"Is there any chance you are pregnant?\" \"When was your last menstrual period?\"        no  13. OTHER " "SYMPTOMS: \"Do you have any other symptoms?\"  (e.g., chills, fatigue, headache, loss of smell or taste, muscle pain, sore throat)        Body aches  14. O2 SATURATION MONITOR:  \"Do you use an oxygen saturation monitor (pulse oximeter) at home?\" If Yes, ask \"What is your reading (oxygen level) today?\" \"What is your usual oxygen saturation reading?\" (e.g., 95%)        na    Protocols used: CORONAVIRUS (COVID-19) DIAGNOSED OR SUSPECTED-ADULT-        Synopsis:  COVID positive.  Stable. Strict precautions for Er. Pt verbalized understanding. Will follow up if needed.   Disposition:  Home Care  Care Advice:  Care Advice Given     Given By Given At Modified    Barbara Garcia CRNP 7/16/2022  6:25 PM No    HOME CARE:   * You should be able to treat this at home.    Barbara Garcia CRNP 7/16/2022  6:25 PM No    REASSURANCE AND EDUCATION - POSITIVE COVID-19 LAB TEST AND MILD SYMPTOMS:  * You had a recent lab test for COVID-19 and it came back positive.  * A positive result on a PCR or rapid self-test kit is highly accurate for diagnosing COVID-19. It is highly likely that you have COVID-19.  * From what you have told me, your symptoms are mild. That is reassuring.  * Here's some care advice to help you and to help prevent others from getting sick.    Barbara Garcia CRNP 7/16/2022  6:25 PM No    GENERAL CARE ADVICE FOR COVID-19 SYMPTOMS:  * The symptoms are generally treated the same whether you have COVID-19, influenza or some other respiratory virus.  * Cough: Use cough drops.  * Feeling dehydrated: Drink extra liquids. If the air in your home is dry, use a humidifier.  * Fever: For fever over 101 F (38.3 C), take acetaminophen every 4 to 6 hours (Adults 650 mg) OR ibuprofen every 6 to 8 hours (Adults 400 mg). Before taking any medicine, read all the instructions on the package. Do not take aspirin unless your doctor has prescribed it for you.  * Muscle aches, headache, and other pains: Often this comes and goes " with the fever. Take acetaminophen every 4 to 6 hours (Adults 650 mg) OR ibuprofen every 6 to 8 hours (Adults 400 mg). Before taking any medicine, read all the instructions on the package.  * Sore throat: Try throat lozenges, hard candy or warm chicken broth.    Barbara Garcia CRNP 7/16/2022  6:25 PM No    COUGH MEDICINES:   * COUGH DROPS: Over-the-counter cough drops can help a lot, especially for mild coughs. They soothe an irritated throat and remove the tickle sensation in the back of the throat. Cough drops are easy to carry with you.  * COUGH SYRUP WITH DEXTROMETHORPHAN: An over-the-counter cough syrup can help your cough. The most common cough suppressant in over-the-counter cough medicines is dextromethorphan.  * HOME REMEDY - HARD CANDY: Hard candy works just as well as over-the-counter cough drops. People who have diabetes should use sugar-free candy.  * HOME REMEDY - HONEY: This old home remedy has been shown to help decrease coughing at night. The adult dosage is 2 teaspoons (10 ml) at bedtime.    Barbara Garcia CRNP 7/16/2022  6:25 PM No    COUGH SYRUP WITH DEXTROMETHORPHAN - EXTRA NOTES AND WARNINGS:  * Do not try to completely stop coughs that produce mucus and phlegm.  * Coughing is helpful. It brings up the mucus from the lungs and helps prevent pneumonia.  * RESEARCH: Some research studies show that dextromethorphan reduces the frequency and severity of cough in those 18 years and older without significant adverse effects. Other studies suggest that dextromethorphan is no better than placebo at reducing a cough.  * DRUG ABUSE: It should be noted that dextromethorphan has become a drug of abuse. This problem is seen most often in teenagers. Overdose symptoms can range from giggling and feeling high to hallucinations and coma.  * WARNING: Do not take dextromethorphan if you are taking a monoamine oxidase (MAO) inhibitor now or in the past 2 weeks. Examples of MAO inhibitors include  isocarboxazid (Marplan), phenelzine (Nardil), selegiline (Eldepryl, Emsam, Zelapar), and tranylcypromine (Parnate).  * WARNING: Do not take dextromethorphan if you are taking venlafaxine (Effexor).  * Before taking any medicine, read all the instructions on the package.    Barbara Garcia CRNP 7/16/2022  6:25 PM No    HUMIDIFIER:   * If the air is dry, use a humidifier in the bedroom.  * Dry air makes coughs worse.    Barbara Garcia CRNP 7/16/2022  6:25 PM No    COUGHING SPELLS:   * Drink warm fluids. Inhale warm mist. This can help relax the airway and also loosen up phlegm.  * Suck on cough drops or hard candy to coat the irritated throat.    Barbara Garcia CRNP 7/16/2022  6:25 PM No    AVOID TOBACCO SMOKE:   * Avoid tobacco smoke.  * Smoking or being exposed to smoke makes coughs much worse.    Barbara Garcia CRNP 7/16/2022  6:25 PM No    PAIN AND FEVER MEDICINES:  * For pain or fever relief, take either acetaminophen or ibuprofen.  * They are over-the-counter (OTC) drugs that help treat both fever and pain. You can buy them at the drugstore.  * Treat fevers above 101 F (38.3 C). The goal of fever therapy is to bring the fever down to a comfortable level. Remember that fever medicine usually lowers fever 2 degrees F (1 - 1 1/2 degrees C).  * ACETAMINOPHEN REGULAR STRENGTH TYLENOL: Take 650 mg (two 325 mg pills) by mouth every 4 to 6 hours as needed. Each Regular Strength Tylenol pill has 325 mg of acetaminophen. The most you should take each day is 3,250 mg (10 pills a day).  * ACETAMINOPHEN - EXTRA STRENGTH TYLENOL: Take 1,000 mg (two 500 mg pills) every 8 hours as needed. Each Extra Strength Tylenol pill has 500 mg of acetaminophen. The most you should take each day is 3,000 mg (6 pills a day).  * IBUPROFEN (E.G., MOTRIN, ADVIL): Take 400 mg (two 200 mg pills) by mouth every 6 hours. The most you should take each day is 1,200 mg (six 200 mg pills), unless your doctor has told you to take more.     Barbara Garcia CRNP 7/16/2022  6:25 PM No    PAIN AND FEVER MEDICINES - EXTRA NOTES AND WARNINGS:  * Use the lowest amount of medicine that makes your pain or fever better.  * Acetaminophen is thought to be safer than ibuprofen or naproxen in people over 65 years old. Acetaminophen is in many OTC and prescription medicines. It might be in more than one medicine that you are taking. You need to be careful and not take an overdose. An acetaminophen overdose can hurt the liver.  * [employer], the company that makes Tylenol, has different dosage instructions for Tylenol in [place] and the [place]. In [place], the maximum recommended dose per day is 4,000 mg or twelve Regular-Strength (325 mg) pills. In the [place], the maximum dose per day is ten Regular-Strength (325 mg) pills.  * CAUTION: Do not take acetaminophen if you have liver disease.  * CAUTION: Do not take ibuprofen if you have stomach problems, kidney disease, are pregnant, or have been told by your doctor to avoid this type of anti-inflammatory drug. Do not take ibuprofen for more than 7 days without consulting your doctor.  * Before taking any medicine, read all the instructions on the package.    Barbara Garcia CRNP 7/16/2022  6:25 PM No    NO ASPIRIN:  * Do not use aspirin for treatment of fever or pain.  * Reason: there is an association between influenza and Reyes' Syndrome.    Barbara Garcia CRNP 7/16/2022  6:25 PM No    COVID-19 - HOW TO PROTECT OTHERS - WHEN YOU ARE SICK WITH COVID-19:  * STAY HOME A MINIMUM OF 5 DAYS: Home isolation is needed for at least 5 days after the symptoms started. Stay home from school or work if you are sick. Do NOT go to Islam services,  centers, shopping, or other public places. Do NOT use public transportation (e.g., bus, taxis, ride-sharing). Do NOT allow any visitors to your home. Leave the house only if you need to seek urgent medical care.  * WEAR A MASK FOR 10 DAYS: Wear a  well-fitted mask for 10 full days any time you are around others inside your home or in public. Do not go to places where you are unable to wear a mask.  * WASH HANDS OFTEN: Wash hands often with soap and water. After coughing or sneezing are important times. If soap and water are not available, use an alcohol-based hand  with at least 60% alcohol, covering all surfaces of your hands and rubbing them together until they feel dry. Avoid touching your eyes, nose, and mouth with unwashed hands.  * CALL AHEAD IF MEDICAL CARE IS NEEDED: If you have a medical appointment, call your doctor's office and tell them you have or may have COVID-19. This will help the office protect themselves and other patients. They will give you directions.    Barbara Garcia CRNP 7/16/2022  6:25 PM No    COVID-19 - FACE MASKS FOR PREVENTION:  * Face masks are important for reducing the spread of COVID-19. They also reduce the spread of influenza (flu). People with COVID-19 can have no symptoms, but still spread the virus.  * Because of the Omicron variant (and other possible future variants) recommendations for wearing masks are pretty much the same for people who are vaccinated or unvaccinated. Mask wearing is even more important if you are in an area of high COVID-19 spread or if you have a weak immune system.  * PEOPLE WHO ARE WELL (NOT SICK WITH COVID-19) SHOULD WEAR MASKS IF:  * ... You are in an indoor public space (such as a Protestant or a grocery store).  * ... You are in a crowded outdoor setting (e.g., concert, music festival, rally).  * ... You are traveling on a plane, bus, train, or other form of public transportation or in transportation hubs such as airports and stations.  * ... You must be around someone who has symptoms of COVID-19 or has tested positive for COVID-19.  * PEOPLE WHO ARE SICK WITH COVID-19 SHOULD WEAR MASKS IF:  * ... You are around other people or animals (such as pets).  * EXCEPTIONS:  * ... Face  mask or covering is optional if outdoors and you can avoid being within 6 feet (2 meters) of other people. Some examples are an outdoor walk or run.  * ... Face coverings also are not recommended for children under 2 years.  * HOW TO SELECT AND USE A FACE MASK: Make sure your mask fits well (without gaps) and fully covers your nose and mouth. More information on how to select and use a mask is available at: https://www.cdc.gov/coronavirus/2019-ncov/prevent-getting-sick/types-of-masks.html.    Barbara Garcia CRNP 7/16/2022  6:25 PM No    COVID-19 - SOME OTHER FACTS:  * INCUBATION PERIOD: Average 5 days (range 2 to 14 days) after coming in contact with a person who has COVID-19 virus.  * NO SYMPTOMS, BUT INFECTED (ASYMPTOMATIC): Approximately 30% of infected patients may have no symptoms.  * MILD INFECTIONS: About 80% of those with symptoms have a mild illness, much like a normal flu or a bad cold. The symptoms usually last 2 weeks.  * SEVERE INFECTIONS: About 20% of those with symptoms develop trouble breathing from viral pneumonia. Many of these need to be admitted to the hospital. People with complications generally recover in 3 to 6 weeks. Severe infections are much less common in people who are vaccinated.  * DEATH RATE: The adult death rate is approximately 1% to 3%. The death rate is lower in children and younger adults. It is higher in older adults. The risk of death is much lower in people who are vaccinated.  * PREVENTION - VACCINE: Several vaccines have been approved and released for use in the [place] and [place]. The COVID-19 vaccine and booster will reduce the chance of you getting COVID-19. If you get COVID-19, the COVID-19 vaccine will decrease the chance of you becoming severely sick or needing to be hospitalized.  * PREVENTION - MEDICINE: The malaria drug chloroquine was studied and found not to be helpful for this disease. It also had cardiac side effects. There are monoclonal antibody  treatments (e.g., REGEN-COV / casirivimab-imdevimab) for outpatients at risk for severe COVID-19. Remember, social distancing and wearing masks have been proven to help prevent COVID-19!    Barbara Garcia CRNP 7/16/2022  6:25 PM No    FAQ - WHEN CAN I STOP HOME ISOLATION IF I AM SICK WITH COVID-19?  * You can STOP HOME ISOLATION AFTER 5 DAYS if:  * ... Fever is gone for at least 24 hours off fever-reducing medicines AND  * ... Any cough and other symptoms are improving  * Wear a well-fitted MASK FOR 10 FULL DAYS any time you are around others inside your home or in public. Do not go to places where you are unable to wear a mask.  * Notes: People who were severely ill with COVID-19 should stay home (isolate) for at least 10 days. Consult your doctor (or NP/PA) before ending isolation.    Barbara Garcia CRNP 7/16/2022  6:25 PM No    FAQ - WHAT MASKS ARE BEST TO PROTECT AGAINST COVID-19?  * There are many types of face masks. Some provide more protection against COVID-19 than others.  * However, it is important to remember ANY FACE MASK IS BETTER THAN NO FACE MASK. Also, any mask you use should fit well (snuggly against the face with no gaps) and should be clean and dry.  * ... CLOTH MASKS made with several layers of finely woven fabric provide good protection. A single fabric layer is not enough.  * ... Disposable SURGICAL MASKS (procedure masks) and KN95 MASKS provide better protection than cloth masks.  * ... NIOSH-approved N95 MASKS (respirator masks) provide the best protection. These are most often used by healthcare workers.  * The CDC recommends that 'you wear the most protective mask you can that fits well and that you will wear consistently.'  * You can find more information on the CDC website: https://www.cdc.gov/coronavirus/2019-ncov/prevent-getting-sick/types-of-masks.html.    Barbara Garcia CRNP 7/16/2022  6:25 PM No    FAQ - DO I NEED SPECIAL MEDICINES TO TREAT COVID-19?    * For healthy  people with MILD SYMPTOMS, prescription medicines are usually not needed. People can treat the symptoms at home using over-the-counter medicines for fever, pain, and cough.  * People with MILD TO MODERATE SYMPTOMS AND WHO ARE AT HIGH RISK for severe COVID-19 may sometimes need special prescription medicines as outpatients. There are monoclonal antibodies (e.g., bamlanivimab, casirivimab-imdevimab, sotrovimab) and antiviral medicines (e.g., nirmatrelvir-ritonavir / Paxlovid, molnupiravir).  * People with SEVERE COVID-19 will need emergency department treatment and hospitalization. Treatment of hospitalized patients may include oxygen, steroids, antiviral medicine (e.g., remdesivir), and immune system medicines (e.g., baricitinib, tocilizumab).  * Risk factors for severe COVID-19 complications include a weak immune system, 65 years and older, obesity, pregnant, chronic lung disease, and other chronic medical conditions.  * This is a complex and changing area of information.  * Talk with your doctor (or NP/PA) if you have questions.    Barbara Garcia CRNP 7/16/2022  6:25 PM No    FAQ - IS IVERMECTIN APPROVED FOR COVID-19?   * NO. Ivermectin is not an approved drug for treating or preventing COVID-19.  * Ivermectin is a drug used to treat parasites and lice.  * Although some people are interested in ivermectin to treat COVID-19, it has NOT been shown to treat or prevent COVID-19.  * Some people have gotten ivermectin on their own through the internet or animal care suppliers.  * People have been hospitalized for toxic effects from taking ivermectin on their own (sometimes taking large overdoses).  * Do not get or take ivermectin without a prescription from your doctor. NEVER take medicines made for animals.    Barbara Garcia CRNP 7/16/2022  6:25 PM No    FAQ - CAN I TAKE IBUPROFEN (ADVIL, MOTRIN) IF I HAVE COVID-19?   * Yes.  * The CDC, WHO, and other experts continue to support the use of ibuprofen (if needed)  for patients with COVID-19. They found no scientific evidence to support the false claim that ibuprofen made COVID-19 worse.    Barbara Garcia CRNP 7/16/2022  6:25 PM No    CALL BACK IF:   * Fever over 103 F (39.4 C)  * Fever lasts over 3 days  * Fever returns after being gone for 24 hours  * Chest pain or difficulty breathing occurs  * You become worse    Barbara Garcia CRNP 7/16/2022  6:25 PM No    CARE ADVICE given per COVID-19 - DIAGNOSED OR SUSPECTED (Adult) guideline.       Patient/Caregiver understands and will follow care advice?  Yes, plans to follow advice        Orders Placed This Encounter:  No orders of the defined types were placed in this encounter.

## 2022-07-18 ENCOUNTER — TELEMEDICINE (OUTPATIENT)
Dept: FAMILY MEDICINE | Facility: CLINIC | Age: 52
End: 2022-07-18
Payer: COMMERCIAL

## 2022-07-18 DIAGNOSIS — U07.1 COVID-19: Primary | ICD-10-CM

## 2022-07-18 PROCEDURE — 99213 OFFICE O/P EST LOW 20 MIN: CPT | Mod: 95 | Performed by: FAMILY MEDICINE

## 2022-07-18 ASSESSMENT — ENCOUNTER SYMPTOMS
FEVER: 1
CHILLS: 1
COUGH: 1

## 2022-07-18 NOTE — PROGRESS NOTES
Verification of Patient Location:  The patient affirms they are currently located in the following state: [place]    Request for Consent:    Audio and Video Encounter   Allison, my name is Harry A. Frankel, MD.  Before we proceed, can you please verify your identification by telling me your full name and date of birth?  Can you tell me who is in the room with you?    You and I are about to have a telemedicine check-in or visit because you have requested it.  This is a live video-conference.  I am a real person, speaking to you in real time.  There is no one else with me on the video-conference.  However, when we use ([employer], [employer], etc) it is important for you to know that the video-conference may not be secure or private.  I am not recording this conversation and I am asking you not to record it.  This telemedicine visit will be billed to your health insurance or you, if you are self-insured.  You understand you will be responsible for any copayments or coinsurances that apply to your telemedicine visit.  Communication platform used for this encounter:  [employer]     Before starting our telemedicine visit, I am required to get your consent for this virtual check-in or visit by telemedicine. Do you consent?      Patient Response to Request for Consent:  Yes      Visit Documentation:  Subjective     Patient ID: Frederic Preciado is a 52 y.o. male.  1970      51 yo male with sick. 2 days. Cough, congestion and fever. Chills and temp to 101. He is Covid +      The following have been reviewed and updated as appropriate in this visit:   Tobacco  Allergies  Meds  Problems  Med Hx  Surg Hx  Fam Hx         Review of Systems   Constitutional: Positive for chills and fever.   HENT: Positive for congestion.    Respiratory: Positive for cough.          Assessment/Plan   Diagnoses and all orders for this visit:    COVID-19 (Primary)  Assessment & Plan:  Paxlovid (GFR good, no drug interactions)  Push  fluids  Isolate      Other orders  -     nirmatrelvir-ritonavir (PAXLOVID) tablet dose package; Take 3 tablets by mouth 2 (two) times a day for 5 days. Take nirmatrelvir 300 mg (TWO 150mg tablets) PO PLUS ritonavir 100 mg (ONE 100mg tablet) by mouth, with all three tablets taken together twice daily      Time Spent:  I spent 13 minutes on this date of service performing the following activities: obtaining history, entering orders and documenting.

## 2023-03-29 ENCOUNTER — OFFICE VISIT (OUTPATIENT)
Dept: FAMILY MEDICINE | Facility: CLINIC | Age: 53
End: 2023-03-29
Payer: COMMERCIAL

## 2023-03-29 VITALS
OXYGEN SATURATION: 97 % | SYSTOLIC BLOOD PRESSURE: 122 MMHG | BODY MASS INDEX: 25.48 KG/M2 | WEIGHT: 172 LBS | HEIGHT: 69 IN | HEART RATE: 70 BPM | TEMPERATURE: 97.3 F | DIASTOLIC BLOOD PRESSURE: 50 MMHG

## 2023-03-29 DIAGNOSIS — Z00.00 ENCOUNTER FOR PREVENTIVE CARE: Primary | ICD-10-CM

## 2023-03-29 DIAGNOSIS — M25.511 CHRONIC RIGHT SHOULDER PAIN: ICD-10-CM

## 2023-03-29 DIAGNOSIS — G89.29 CHRONIC RIGHT SHOULDER PAIN: ICD-10-CM

## 2023-03-29 DIAGNOSIS — M25.551 RIGHT HIP PAIN: ICD-10-CM

## 2023-03-29 PROCEDURE — 3008F BODY MASS INDEX DOCD: CPT | Performed by: FAMILY MEDICINE

## 2023-03-29 PROCEDURE — 36415 COLL VENOUS BLD VENIPUNCTURE: CPT | Performed by: FAMILY MEDICINE

## 2023-03-29 PROCEDURE — 99396 PREV VISIT EST AGE 40-64: CPT | Mod: 25 | Performed by: FAMILY MEDICINE

## 2023-03-29 ASSESSMENT — ENCOUNTER SYMPTOMS
PALPITATIONS: 0
BLOOD IN STOOL: 0
BRUISES/BLEEDS EASILY: 0
NERVOUS/ANXIOUS: 0
SHORTNESS OF BREATH: 0
FATIGUE: 0
FREQUENCY: 1
CONSTIPATION: 0
POLYPHAGIA: 0
HEADACHES: 0
BACK PAIN: 0
ARTHRALGIAS: 1
POLYDIPSIA: 0
DIARRHEA: 0

## 2023-03-29 ASSESSMENT — PATIENT HEALTH QUESTIONNAIRE - PHQ9: SUM OF ALL RESPONSES TO PHQ9 QUESTIONS 1 & 2: 0

## 2023-03-29 NOTE — PROGRESS NOTES
[address] [address]  [phone]       Reason for visit:   Chief Complaint   Patient presents with   • Annual Exam      HPI   Frederic Preciado is a 53 y.o. male who presents with CPE. He has increased urination.He is having shoulder and hip pain.        History reviewed. No pertinent past medical history.  History reviewed. No pertinent surgical history.  Social History     Tobacco Use   • Smoking status: Never   • Smokeless tobacco: Never   Substance Use Topics   • Alcohol use: Yes     Comment: socially      Social History     Social History Narrative    Do you wear your seatbelt? Yes    Do you have smoke detector in your home? Yes    Do you have a carbon monoxide detector in your home? 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      Family History   Problem Relation Age of Onset   • Stroke Biological Mother    • Atrial fibrillation Biological Father      No known allergies  No current outpatient medications on file.     No current facility-administered medications for this visit.       Patient Active Problem List    Diagnosis Date Noted   • Chronic right shoulder pain 03/29/2023   • Right hip pain 03/29/2023   • COVID-19 07/18/2022   • Groin injury 05/04/2022   • Spinal stenosis of lumbar region without neurogenic claudication 12/21/2021   • Tinnitus of both ears 03/09/2021   • Encounter for preventive care 05/28/2019   • Measles screening 05/28/2019         Review of Systems   Constitutional: Negative for fatigue.   HENT: Negative for hearing loss.    Eyes: Negative for visual disturbance.   Respiratory: Negative for shortness of breath.    Cardiovascular: Negative for chest pain, palpitations and leg swelling.   Gastrointestinal: Negative for blood in stool, constipation and diarrhea.   Endocrine: Negative for polydipsia, polyphagia and polyuria.   Genitourinary: Positive for frequency.   Musculoskeletal: Positive  "for arthralgias. Negative for back pain.   Skin: Negative for rash.   Neurological: Negative for headaches.   Hematological: Does not bruise/bleed easily.   Psychiatric/Behavioral: The patient is not nervous/anxious.      Objective   Vitals:    03/29/23 0837   BP: (!) 122/50   BP Location: Right upper arm   Patient Position: Sitting   Pulse: 70   Temp: 36.3 °C (97.3 °F)   TempSrc: Temporal   SpO2: 97%   Weight: 78 kg (172 lb)   Height: 1.753 m (5' 9\")     Body mass index is 25.4 kg/m².  Physical Exam  Constitutional:       Appearance: Normal appearance.   HENT:      Right Ear: Tympanic membrane and ear canal normal.      Left Ear: Tympanic membrane and ear canal normal.   Eyes:      Extraocular Movements: Extraocular movements intact.      Conjunctiva/sclera: Conjunctivae normal.      Pupils: Pupils are equal, round, and reactive to light.   Neck:      Thyroid: No thyromegaly.      Vascular: No carotid bruit.   Cardiovascular:      Rate and Rhythm: Normal rate and regular rhythm.      Pulses: Normal pulses.      Heart sounds: Normal heart sounds.   Pulmonary:      Effort: Pulmonary effort is normal.      Breath sounds: Normal breath sounds.   Abdominal:      General: Abdomen is flat. Bowel sounds are normal.      Palpations: Abdomen is soft. There is no hepatomegaly or splenomegaly.   Musculoskeletal:         General: Normal range of motion.      Cervical back: Normal range of motion.      Right lower leg: No edema.      Left lower leg: No edema.   Lymphadenopathy:      Cervical: No cervical adenopathy.   Skin:     General: Skin is warm and dry.   Neurological:      General: No focal deficit present.      Mental Status: He is alert and oriented to person, place, and time.      Deep Tendon Reflexes: Reflexes normal.   Psychiatric:         Mood and Affect: Mood normal.         Behavior: Behavior normal.         Thought Content: Thought content normal.         Judgment: Judgment normal.         Procedures    Lab " Results   Component Value Date    WBC 5.5 12/21/2021    HGB 13.2 12/21/2021    HCT 39.7 12/21/2021     12/21/2021    CHOL 202 (H) 12/21/2021    TRIG 88 12/21/2021    HDL 58 12/21/2021    LDLCALC 128 (H) 12/21/2021    ALT 15 12/21/2021    AST 14 12/21/2021     12/21/2021    K 4.4 12/21/2021     12/21/2021    CREATININE 0.95 12/21/2021    BUN 18 12/21/2021    CO2 27 12/21/2021    PSA 1.0 12/21/2021    GLUCOSE 107 (H) 12/21/2021    HGBA1C 5.4 02/19/2018           Assessment   Problem List Items Addressed This Visit        Musculoskeletal    Chronic right shoulder pain     Dr Castro         Relevant Orders    Ambulatory referral to Sports Medicine    Right hip pain     Dr Castro         Relevant Orders    Ambulatory referral to Sports Medicine       Other    Encounter for preventive care - Primary     Labs  Other services are up to date         Relevant Orders    CBC and Differential    Comprehensive metabolic panel    Hemoglobin A1c    Lipid panel    PSA           Harry A. Frankel, MD  3/29/2023

## 2023-03-30 LAB
ALBUMIN SERPL-MCNC: 4.6 G/DL (ref 3.8–4.9)
ALBUMIN/GLOB SERPL: 2 {RATIO} (ref 1.2–2.2)
ALP SERPL-CCNC: 62 IU/L (ref 44–121)
ALT SERPL-CCNC: 16 IU/L (ref 0–44)
AST SERPL-CCNC: 23 IU/L (ref 0–40)
BASOPHILS # BLD AUTO: 0 X10E3/UL (ref 0–0.2)
BASOPHILS NFR BLD AUTO: 1 %
BILIRUB SERPL-MCNC: 0.4 MG/DL (ref 0–1.2)
BUN SERPL-MCNC: 19 MG/DL (ref 6–24)
BUN/CREAT SERPL: 22 (ref 9–20)
CALCIUM SERPL-MCNC: 9.5 MG/DL (ref 8.7–10.2)
CHLORIDE SERPL-SCNC: 103 MMOL/L (ref 96–106)
CHOLEST SERPL-MCNC: 221 MG/DL (ref 100–199)
CO2 SERPL-SCNC: 24 MMOL/L (ref 20–29)
CREAT SERPL-MCNC: 0.85 MG/DL (ref 0.76–1.27)
EGFRCR SERPLBLD CKD-EPI 2021: 103 ML/MIN/1.73
EOSINOPHIL # BLD AUTO: 0.3 X10E3/UL (ref 0–0.4)
EOSINOPHIL NFR BLD AUTO: 7 %
ERYTHROCYTE [DISTWIDTH] IN BLOOD BY AUTOMATED COUNT: 12.9 % (ref 11.6–15.4)
GLOBULIN SER CALC-MCNC: 2.3 G/DL (ref 1.5–4.5)
GLUCOSE SERPL-MCNC: 120 MG/DL (ref 70–99)
HBA1C MFR BLD: 5.7 % (ref 4.8–5.6)
HCT VFR BLD AUTO: 38.5 % (ref 37.5–51)
HDLC SERPL-MCNC: 47 MG/DL
HGB BLD-MCNC: 13.2 G/DL (ref 13–17.7)
IMM GRANULOCYTES # BLD AUTO: 0 X10E3/UL (ref 0–0.1)
IMM GRANULOCYTES NFR BLD AUTO: 0 %
LDLC SERPL CALC-MCNC: 152 MG/DL (ref 0–99)
LYMPHOCYTES # BLD AUTO: 1.5 X10E3/UL (ref 0.7–3.1)
LYMPHOCYTES NFR BLD AUTO: 34 %
MCH RBC QN AUTO: 30 PG (ref 26.6–33)
MCHC RBC AUTO-ENTMCNC: 34.3 G/DL (ref 31.5–35.7)
MCV RBC AUTO: 88 FL (ref 79–97)
MONOCYTES # BLD AUTO: 0.3 X10E3/UL (ref 0.1–0.9)
MONOCYTES NFR BLD AUTO: 7 %
NEUTROPHILS # BLD AUTO: 2.3 X10E3/UL (ref 1.4–7)
NEUTROPHILS NFR BLD AUTO: 51 %
PLATELET # BLD AUTO: 211 X10E3/UL (ref 150–450)
POTASSIUM SERPL-SCNC: 4.6 MMOL/L (ref 3.5–5.2)
PROT SERPL-MCNC: 6.9 G/DL (ref 6–8.5)
PSA SERPL-MCNC: 0.9 NG/ML (ref 0–4)
RBC # BLD AUTO: 4.4 X10E6/UL (ref 4.14–5.8)
SODIUM SERPL-SCNC: 142 MMOL/L (ref 134–144)
TRIGL SERPL-MCNC: 122 MG/DL (ref 0–149)
VLDLC SERPL CALC-MCNC: 22 MG/DL (ref 5–40)
WBC # BLD AUTO: 4.5 X10E3/UL (ref 3.4–10.8)

## 2023-04-04 DIAGNOSIS — E78.00 PURE HYPERCHOLESTEROLEMIA: Primary | ICD-10-CM

## 2023-04-11 ENCOUNTER — TELEMEDICINE (OUTPATIENT)
Dept: FAMILY MEDICINE | Facility: CLINIC | Age: 53
End: 2023-04-11
Payer: COMMERCIAL

## 2023-04-11 DIAGNOSIS — U07.1 COVID-19: Primary | ICD-10-CM

## 2023-04-11 PROCEDURE — 99213 OFFICE O/P EST LOW 20 MIN: CPT | Mod: 95 | Performed by: FAMILY MEDICINE

## 2023-04-11 ASSESSMENT — ENCOUNTER SYMPTOMS
FEVER: 0
COUGH: 1

## 2023-04-11 NOTE — PROGRESS NOTES
Verification of Patient Location:  The patient affirms they are currently located in the following state: [place]    Request for Consent:    Audio and Video Encounter   Allison, my name is Harry A. Frankel, MD.  Before we proceed, can you please verify your identification by telling me your full name and date of birth?  Can you tell me who is in the room with you?    You and I are about to have a telemedicine check-in or visit because you have requested it.  This is a live video-conference.  I am a real person, speaking to you in real time.  There is no one else with me on the video-conference. I am not recording this conversation and I am asking you not to record it.  This telemedicine visit will be billed to your health insurance or you, if you are self-insured.  You understand you will be responsible for any copayments or coinsurances that apply to your telemedicine visit.  Communication platform used for this encounter:  [employer] Video Visit (Epic Video Client)       Before starting our telemedicine visit, I am required to get your consent for this virtual check-in or visit by telemedicine. Do you consent?      Patient Response to Request for Consent:  Yes      Visit Documentation:  Subjective     Patient ID: Frederic Preciado is a 53 y.o. male.  1970      54 yo male with Covid. 1 day. Cough and body aches. No fever. He had Covid over the summer and did well on Paxlovid and is requesting treatment.      The following have been reviewed and updated as appropriate in this visit:   Tobacco  Allergies  Meds  Problems  Med Hx  Surg Hx  Fam Hx       Review of Systems   Constitutional: Negative for fever.   Respiratory: Positive for cough.          Assessment/Plan   Diagnoses and all orders for this visit:    COVID-19 (Primary)  Assessment & Plan:  GFR >60  Nodrug interactions  He will start Paxlovid      Other orders  -     nirmatrelvir-ritonavir (PAXLOVID) tablets,dose pack dose package; Take 3 tablets  by mouth 2 (two) times a day for 5 days. Each dose is 2 tablets of nirmatrelvir and one tablet of ritonavir      Time Spent:  I spent 11 minutes on this date of service performing the following activities: obtaining history, entering orders and documenting.

## 2023-05-04 ENCOUNTER — HOSPITAL ENCOUNTER (OUTPATIENT)
Dept: RADIOLOGY | Age: 53
Discharge: HOME | End: 2023-05-04
Attending: FAMILY MEDICINE
Payer: COMMERCIAL

## 2023-05-04 ENCOUNTER — TRANSCRIBE ORDERS (OUTPATIENT)
Dept: RADIOLOGY | Age: 53
End: 2023-05-04

## 2023-05-04 DIAGNOSIS — M75.101 UNSPECIFIED ROTATOR CUFF TEAR OR RUPTURE OF RIGHT SHOULDER, NOT SPECIFIED AS TRAUMATIC: ICD-10-CM

## 2023-05-04 DIAGNOSIS — M25.551 PAIN IN RIGHT HIP: ICD-10-CM

## 2023-05-04 DIAGNOSIS — M75.101 UNSPECIFIED ROTATOR CUFF TEAR OR RUPTURE OF RIGHT SHOULDER, NOT SPECIFIED AS TRAUMATIC: Primary | ICD-10-CM

## 2023-05-04 PROCEDURE — 73502 X-RAY EXAM HIP UNI 2-3 VIEWS: CPT | Mod: RT

## 2023-05-04 PROCEDURE — 73030 X-RAY EXAM OF SHOULDER: CPT | Mod: RT

## 2023-05-08 ENCOUNTER — HOSPITAL ENCOUNTER (OUTPATIENT)
Dept: RADIOLOGY | Age: 53
Discharge: HOME | End: 2023-05-08
Attending: FAMILY MEDICINE

## 2023-05-08 DIAGNOSIS — E78.00 PURE HYPERCHOLESTEROLEMIA: ICD-10-CM

## 2023-05-08 PROCEDURE — 75571 CT HRT W/O DYE W/CA TEST: CPT | Mod: MG

## 2023-06-08 ENCOUNTER — TELEPHONE (OUTPATIENT)
Dept: FAMILY MEDICINE | Facility: CLINIC | Age: 53
End: 2023-06-08
Payer: COMMERCIAL

## 2023-06-08 NOTE — TELEPHONE ENCOUNTER
Patient coming in on 6/15 for Hep B. Typhoid, and measles vaccines for trip to [facility] with wife. Please order.

## 2023-06-15 ENCOUNTER — CLINICAL SUPPORT (OUTPATIENT)
Dept: FAMILY MEDICINE | Facility: CLINIC | Age: 53
End: 2023-06-15
Payer: COMMERCIAL

## 2023-06-15 PROCEDURE — 90746 HEPB VACCINE 3 DOSE ADULT IM: CPT | Performed by: FAMILY MEDICINE

## 2023-06-15 PROCEDURE — 90472 IMMUNIZATION ADMIN EACH ADD: CPT | Performed by: FAMILY MEDICINE

## 2023-06-15 PROCEDURE — 90691 TYPHOID VACCINE IM: CPT | Performed by: FAMILY MEDICINE

## 2023-06-15 PROCEDURE — 90471 IMMUNIZATION ADMIN: CPT | Performed by: FAMILY MEDICINE

## 2023-06-15 PROCEDURE — 90707 MMR VACCINE SC: CPT | Performed by: FAMILY MEDICINE

## 2023-07-31 ENCOUNTER — TELEPHONE (OUTPATIENT)
Dept: FAMILY MEDICINE | Facility: CLINIC | Age: 53
End: 2023-07-31

## 2023-07-31 NOTE — TELEPHONE ENCOUNTER
[facility] Appointment Request   Provider: Dr. Frankel  Appointment Type: Diagnostic  Reason for Visit: 2nd Hep B shot  Available Day and Time:   Best Contact Number: [phone]    The practice will reach out to schedule your appointment within the next 2 business days.

## 2023-08-02 NOTE — TELEPHONE ENCOUNTER
[facility] Appointment Request   Provider: Dr. Frankel  Appointment Type: DXS  Reason for Visit: Reschedule Hep B shot  Available Day and Time:   Best Contact Number: [phone]  The practice will reach out to schedule your appointment within the next 2 business days.

## 2024-05-13 ENCOUNTER — TELEPHONE (OUTPATIENT)
Dept: FAMILY MEDICINE | Facility: CLINIC | Age: 54
End: 2024-05-13

## 2024-05-13 NOTE — TELEPHONE ENCOUNTER
Test Order Request   Patient PCP: Frankel, Harry A, MD  Next Office Visit: 5/29/2024  Preferred Lab: [id number]  [address] [facility] [address]  Tests Requested: annual labs  , MyChart, or US Mail?: MyChart    The practice will reach out to discuss your request within 2 business days.

## 2024-05-14 NOTE — TELEPHONE ENCOUNTER
Left detailed voicemail that Dr.Frankel will order any required annual labs at time of annual appt to avoid needing to repeate or add on any lab work.

## 2024-05-29 ENCOUNTER — OFFICE VISIT (OUTPATIENT)
Dept: FAMILY MEDICINE | Facility: CLINIC | Age: 54
End: 2024-05-29
Payer: COMMERCIAL

## 2024-05-29 VITALS
TEMPERATURE: 97.8 F | DIASTOLIC BLOOD PRESSURE: 82 MMHG | HEART RATE: 77 BPM | BODY MASS INDEX: 26.19 KG/M2 | HEIGHT: 69 IN | SYSTOLIC BLOOD PRESSURE: 120 MMHG | WEIGHT: 176.8 LBS | OXYGEN SATURATION: 99 %

## 2024-05-29 DIAGNOSIS — E78.00 PURE HYPERCHOLESTEROLEMIA: ICD-10-CM

## 2024-05-29 DIAGNOSIS — Z00.00 ENCOUNTER FOR PREVENTIVE CARE: Primary | ICD-10-CM

## 2024-05-29 PROCEDURE — 90471 IMMUNIZATION ADMIN: CPT | Performed by: FAMILY MEDICINE

## 2024-05-29 PROCEDURE — 99396 PREV VISIT EST AGE 40-64: CPT | Mod: 25 | Performed by: FAMILY MEDICINE

## 2024-05-29 PROCEDURE — 90746 HEPB VACCINE 3 DOSE ADULT IM: CPT | Performed by: FAMILY MEDICINE

## 2024-05-29 PROCEDURE — 3008F BODY MASS INDEX DOCD: CPT | Performed by: FAMILY MEDICINE

## 2024-05-29 RX ORDER — CELECOXIB 200 MG/1
200 CAPSULE ORAL DAILY
COMMUNITY
Start: 2024-05-05

## 2024-05-29 ASSESSMENT — ENCOUNTER SYMPTOMS
DIARRHEA: 0
ARTHRALGIAS: 0
PALPITATIONS: 0
POLYDIPSIA: 0
BLOOD IN STOOL: 0
CONSTIPATION: 0
POLYPHAGIA: 0
NERVOUS/ANXIOUS: 0
BRUISES/BLEEDS EASILY: 0
BACK PAIN: 0
SHORTNESS OF BREATH: 0
FATIGUE: 0
HEADACHES: 0

## 2024-05-29 NOTE — PROGRESS NOTES
[place] [address]  [phone]       Reason for visit:   Chief Complaint   Patient presents with   • Annual Exam      HPI   Frederic Preciado is a 54 y.o. male who presents with CPE. He has hyperlipidemia with a CT calcium score of 0. He has a torn right hip labrum. He has been exercising and eats healthy.      History reviewed. No pertinent past medical history.  History reviewed. No pertinent surgical history.  Social History     Tobacco Use   • Smoking status: Never   • Smokeless tobacco: Never   Substance Use Topics   • Alcohol use: Yes     Comment: socially      Social History     Social History Narrative    Do you wear your seatbelt? Yes    Do you have smoke detector in your home? Yes    Do you have a carbon monoxide detector in your home? 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      Family History   Problem Relation Age of Onset   • Stroke Biological Mother    • Atrial fibrillation Biological Father      No known allergies  Current Outpatient Medications   Medication Sig Dispense Refill   • celecoxib (celeBREX) 200 mg capsule Take 200 mg by mouth daily.       No current facility-administered medications for this visit.       Patient Active Problem List    Diagnosis Date Noted   • Pure hypercholesterolemia 04/04/2023   • Chronic right shoulder pain 03/29/2023   • Right hip pain 03/29/2023   • COVID-19 07/18/2022   • Groin injury 05/04/2022   • Spinal stenosis of lumbar region without neurogenic claudication 12/21/2021   • Tinnitus of both ears 03/09/2021   • Encounter for preventive care 05/28/2019   • Measles screening 05/28/2019         Review of Systems   Constitutional:  Negative for fatigue.   HENT:  Negative for hearing loss.    Eyes:  Negative for visual disturbance.   Respiratory:  Negative for shortness of breath.    Cardiovascular:  Negative for chest pain, palpitations and leg swelling.  "  Gastrointestinal:  Negative for blood in stool, constipation and diarrhea.   Endocrine: Negative for polydipsia, polyphagia and polyuria.   Musculoskeletal:  Negative for arthralgias and back pain.   Skin:  Negative for rash.   Neurological:  Negative for headaches.   Hematological:  Does not bruise/bleed easily.   Psychiatric/Behavioral:  The patient is not nervous/anxious.      Objective   Vitals:    05/29/24 1423   BP: 120/82   BP Location: Left upper arm   Patient Position: Sitting   Pulse: 77   Temp: 36.6 °C (97.8 °F)   TempSrc: Oral   SpO2: 99%   Weight: 80.2 kg (176 lb 12.8 oz)   Height: 1.753 m (5' 9\")     Body mass index is 26.11 kg/m².  Physical Exam  Constitutional:       Appearance: Normal appearance.   HENT:      Right Ear: Tympanic membrane and ear canal normal.      Left Ear: Tympanic membrane and ear canal normal.   Eyes:      Extraocular Movements: Extraocular movements intact.      Conjunctiva/sclera: Conjunctivae normal.      Pupils: Pupils are equal, round, and reactive to light.   Neck:      Thyroid: No thyromegaly.      Vascular: No carotid bruit.   Cardiovascular:      Rate and Rhythm: Normal rate and regular rhythm.      Pulses: Normal pulses.      Heart sounds: Normal heart sounds.   Pulmonary:      Effort: Pulmonary effort is normal.      Breath sounds: Normal breath sounds.   Abdominal:      General: Abdomen is flat. Bowel sounds are normal.      Palpations: Abdomen is soft. There is no hepatomegaly or splenomegaly.   Musculoskeletal:         General: Normal range of motion.      Cervical back: Normal range of motion.      Right lower leg: No edema.      Left lower leg: No edema.   Lymphadenopathy:      Cervical: No cervical adenopathy.   Skin:     General: Skin is warm and dry.   Neurological:      General: No focal deficit present.      Mental Status: He is alert and oriented to person, place, and time.      Deep Tendon Reflexes: Reflexes normal.   Psychiatric:         Mood and Affect: " Mood normal.         Behavior: Behavior normal.         Thought Content: Thought content normal.         Judgment: Judgment normal.         Procedures    Lab Results   Component Value Date    WBC 4.5 03/29/2023    HGB 13.2 03/29/2023    HCT 38.5 03/29/2023     03/29/2023    CHOL 221 (H) 03/29/2023    TRIG 122 03/29/2023    HDL 47 03/29/2023    LDLCALC 152 (H) 03/29/2023    ALT 16 03/29/2023    AST 23 03/29/2023     03/29/2023    K 4.6 03/29/2023     03/29/2023    CREATININE 0.85 03/29/2023    BUN 19 03/29/2023    CO2 24 03/29/2023    PSA 0.9 03/29/2023    GLUCOSE 120 (H) 03/29/2023    HGBA1C 5.7 (H) 03/29/2023           Assessment   Problem List Items Addressed This Visit        Other    Encounter for preventive care - Primary     Labs  Hep B vax         Relevant Orders    CBC and differential    Comprehensive metabolic panel    Lipid panel    Hemoglobin A1c    PSA    Hepatitis C Antibody With Reflex    HIV 1,2 AB P24 AG    Pure hypercholesterolemia     Labs  CT calc score 0                Harry A. Frankel, MD  5/29/2024

## 2024-06-14 LAB
BASOPHILS # BLD AUTO: 0.1 X10E3/UL (ref 0–0.2)
BASOPHILS NFR BLD AUTO: 1 %
EOSINOPHIL # BLD AUTO: 0.4 X10E3/UL (ref 0–0.4)
EOSINOPHIL NFR BLD AUTO: 7 %
ERYTHROCYTE [DISTWIDTH] IN BLOOD BY AUTOMATED COUNT: 13 % (ref 11.6–15.4)
HCT VFR BLD AUTO: 39.6 % (ref 37.5–51)
HGB BLD-MCNC: 13.1 G/DL (ref 13–17.7)
IMM GRANULOCYTES # BLD AUTO: 0 X10E3/UL (ref 0–0.1)
IMM GRANULOCYTES NFR BLD AUTO: 0 %
LYMPHOCYTES # BLD AUTO: 1.5 X10E3/UL (ref 0.7–3.1)
LYMPHOCYTES NFR BLD AUTO: 29 %
MCH RBC QN AUTO: 28.9 PG (ref 26.6–33)
MCHC RBC AUTO-ENTMCNC: 33.1 G/DL (ref 31.5–35.7)
MCV RBC AUTO: 87 FL (ref 79–97)
MONOCYTES # BLD AUTO: 0.4 X10E3/UL (ref 0.1–0.9)
MONOCYTES NFR BLD AUTO: 8 %
NEUTROPHILS # BLD AUTO: 2.8 X10E3/UL (ref 1.4–7)
NEUTROPHILS NFR BLD AUTO: 55 %
PLATELET # BLD AUTO: 207 X10E3/UL (ref 150–450)
RBC # BLD AUTO: 4.53 X10E6/UL (ref 4.14–5.8)
WBC # BLD AUTO: 5 X10E3/UL (ref 3.4–10.8)

## 2024-06-15 LAB
ALBUMIN SERPL-MCNC: 4.3 G/DL (ref 3.8–4.9)
ALBUMIN/GLOB SERPL: 2 {RATIO}
ALP SERPL-CCNC: 70 IU/L (ref 44–121)
ALT SERPL-CCNC: 21 IU/L (ref 0–44)
AST SERPL-CCNC: 25 IU/L (ref 0–40)
BILIRUB SERPL-MCNC: 0.2 MG/DL (ref 0–1.2)
BUN SERPL-MCNC: 17 MG/DL (ref 6–24)
BUN/CREAT SERPL: 18 (ref 9–20)
CALCIUM SERPL-MCNC: 9.5 MG/DL (ref 8.7–10.2)
CHLORIDE SERPL-SCNC: 104 MMOL/L (ref 96–106)
CHOLEST SERPL-MCNC: 207 MG/DL (ref 100–199)
CO2 SERPL-SCNC: 27 MMOL/L (ref 20–29)
CREAT SERPL-MCNC: 0.94 MG/DL (ref 0.76–1.27)
EGFRCR SERPLBLD CKD-EPI 2021: 96 ML/MIN/1.73
GLOBULIN SER CALC-MCNC: 2.1 G/DL (ref 1.5–4.5)
GLUCOSE SERPL-MCNC: 107 MG/DL (ref 70–99)
HBA1C MFR BLD: 5.8 % (ref 4.8–5.6)
HCV AB SERPL QL IA: NORMAL
HCV IGG SERPL QL IA: NON REACTIVE
HDLC SERPL-MCNC: 55 MG/DL
HIV 1+2 AB+HIV1 P24 AG SERPL QL IA: NON REACTIVE
LDLC SERPL CALC-MCNC: 138 MG/DL (ref 0–99)
POTASSIUM SERPL-SCNC: 4.8 MMOL/L (ref 3.5–5.2)
PROT SERPL-MCNC: 6.4 G/DL (ref 6–8.5)
PSA SERPL-MCNC: 1.2 NG/ML (ref 0–4)
SODIUM SERPL-SCNC: 142 MMOL/L (ref 134–144)
TRIGL SERPL-MCNC: 76 MG/DL (ref 0–149)
VLDLC SERPL CALC-MCNC: 14 MG/DL (ref 5–40)

## 2024-10-04 ENCOUNTER — OFFICE VISIT (OUTPATIENT)
Dept: ORTHOPEDICS | Facility: CLINIC | Age: 54
End: 2024-10-04
Payer: COMMERCIAL

## 2024-10-04 ENCOUNTER — HOSPITAL ENCOUNTER (OUTPATIENT)
Dept: RADIOLOGY | Facility: HOSPITAL | Age: 54
Discharge: HOME | End: 2024-10-04
Attending: ORTHOPAEDIC SURGERY
Payer: COMMERCIAL

## 2024-10-04 DIAGNOSIS — M25.531 RIGHT WRIST PAIN: Primary | ICD-10-CM

## 2024-10-04 DIAGNOSIS — M25.531 RIGHT WRIST PAIN: ICD-10-CM

## 2024-10-04 PROCEDURE — 99204 OFFICE O/P NEW MOD 45 MIN: CPT | Performed by: ORTHOPAEDIC SURGERY

## 2024-10-04 PROCEDURE — 73110 X-RAY EXAM OF WRIST: CPT | Mod: RT

## 2024-10-04 RX ORDER — CELECOXIB 200 MG/1
200 CAPSULE ORAL 2 TIMES DAILY
Qty: 180 CAPSULE | Refills: 1 | Status: SHIPPED | OUTPATIENT
Start: 2024-10-04 | End: 2025-04-02

## 2024-10-04 NOTE — PROGRESS NOTES
Subjective      Patient ID: Frederic Preciado is a 54 y.o. male.  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  Patient states he first noticed symptoms approximately 1 year ago.  He does not remember any specific injury or inciting trauma.  He states pain has been off and on.  He has had tried bracing, which is helpful.  He is very active and enjoys working out, surfing, and skiing.  He notices the pain is worst with the wrist loaded in an extended position such as when doing push-ups.  He denies any numbness or tingling.  He has also noticed a small cyst over the dorsal aspect of his right middle finger DIP joint.    No past medical history on file.  No past surgical history on file.  Current Outpatient Medications   Medication Sig Dispense Refill    celecoxib (celeBREX) 200 mg capsule Take 1 capsule (200 mg total) by mouth 2 (two) times a day. 180 capsule 1    celecoxib (celeBREX) 200 mg capsule Take 200 mg by mouth daily.       No current facility-administered medications for this visit.     Allergies   Allergen Reactions    No Known Allergies        The following have been reviewed and updated as appropriate in this visit:        Review of Systems:  A review of systems was completed and reviewed. All review of systems are negative except for what was stated in the HPI.    Objective     There were no vitals filed for this visit.  There is no height or weight on file to calculate BMI.    Physical Examination  General Head and Neck Exam: The patient is well-appearing and in no apparent distress. Cervical mobility is full in all directions.    Skin and Muscle Exam: No skin changes are noted to the upper extremities. Muscle bulk and contour are within normal limits without evidence of atrophy.    Range of Motion and Palpation Tests: Mobility is full about the elbows with flexion and  extension. Forearm supination measures 85/85 degrees. Forearm pronation measures 85/85 degrees. Wrist flexion measures 65/65 degrees. Wrist extension measure 60/60 degrees. Digital flexion and extension are full. Thumb opposition is full to the bases of the small fingers bilaterally. Thumb abduction measures 5/5 in strength.    No cords or nodules are palpated. No triggering is observed.    No tenderness over the thumb CMC articulations is observed.  There is tenderness to palpation directly over the right SL interval.  Scaphoid shift test is negative. Finkelstein's test is negative. Scapholunate and Lunotriquetral ballottement tests are negative. Ulnar snuffbox tenderness is negative. Ulnar impingement test is negative. Pisotriquetral shear test is negative.  No ECU tendon subluxation.  DRUJ piano key test is negative. There is no evidence of radiocarpal, midcarpal, or intercarpal joint instability with provocation.    Neurologic, Vascular, Motor and Misc Exams:  Sensation is intact to light touch in the ulnar, median, and radial nerve distributions. Tinel's testing is negative at the brachial plexus, pronator, cubital, radial, Guyon's, carpal tunnel, and dorsal radial sensory nerve. Fingers are pink and well-perfused. Capillary refill is brisk.     Motor testing is 5/5 in the deltoids, biceps, triceps, extensors, flexors, and intrinsics.    Miscellaneous testing including cubital tunnel compression, Phalen's, reverse Phalen's, Froment's, Wartenberg's, Cross Finger, Clawing are negative, bilaterally.    Imagin views of the right wrist were taken and reviewed today.  There are no acute fractures dislocations.  There are no significant degenerative changes.  There is no obvious SL widening.  There is overall good carpal alignment with no carpal collapse patterns.  No other acute osseous abnormalities.    Assessment/Plan   Diagnoses and all orders for this visit:    Right wrist pain (Primary)  Assessment &  Plan:  I reviewed the findings in detail with Frederic today.  He has pain over the SL interval.  Is worse with wrist extension.  He may have a strain to his SL ligament.  This been going on for a year with not much improvement.  I would like to order an MRI of the right wrist for further evaluation.  I will give him a call after the MRI is completed to review the results with him over the phone and to further discuss treatment options.  In the meantime, I discussed with him about activity modification.  I recommended not loading the wrist in the extended position and try to keep the wrist neutral when performing exercises and activities.  I also gave him a new prescription for Celebrex today.  All of his questions were answered, and he agreed with the treatment plan.    Orders:  -     MRI WRIST RIGHT WITHOUT CONTRAST; Future  -     celecoxib (celeBREX) 200 mg capsule; Take 1 capsule (200 mg total) by mouth 2 (two) times a day.

## 2024-10-04 NOTE — ASSESSMENT & PLAN NOTE
I reviewed the findings in detail with Frederic today.  He has pain over the SL interval.  Is worse with wrist extension.  He may have a strain to his SL ligament.  This been going on for a year with not much improvement.  I would like to order an MRI of the right wrist for further evaluation.  I will give him a call after the MRI is completed to review the results with him over the phone and to further discuss treatment options.  In the meantime, I discussed with him about activity modification.  I recommended not loading the wrist in the extended position and try to keep the wrist neutral when performing exercises and activities.  I also gave him a new prescription for Celebrex today.  All of his questions were answered, and he agreed with the treatment plan.

## 2024-10-21 ENCOUNTER — HOSPITAL ENCOUNTER (OUTPATIENT)
Dept: RADIOLOGY | Facility: CLINIC | Age: 54
Discharge: HOME | End: 2024-10-21
Attending: ORTHOPAEDIC SURGERY
Payer: COMMERCIAL

## 2024-10-21 DIAGNOSIS — M25.531 RIGHT WRIST PAIN: ICD-10-CM

## 2024-10-30 ENCOUNTER — TRANSCRIBE ORDERS (OUTPATIENT)
Dept: SCHEDULING | Age: 54
End: 2024-10-30

## 2024-10-30 ENCOUNTER — OFFICE VISIT (OUTPATIENT)
Dept: SURGERY | Facility: CLINIC | Age: 54
End: 2024-10-30
Payer: COMMERCIAL

## 2024-10-30 DIAGNOSIS — S69.90XA SCAPHOLUNATE LIGAMENT INJURY WITH NO INSTABILITY, INITIAL ENCOUNTER: Primary | ICD-10-CM

## 2024-10-30 DIAGNOSIS — J35.01 CHRONIC TONSILLITIS: Primary | ICD-10-CM

## 2024-10-30 PROCEDURE — 99213 OFFICE O/P EST LOW 20 MIN: CPT | Performed by: FAMILY MEDICINE

## 2024-10-30 NOTE — PROGRESS NOTES
Sports Medicine New Patient Progress Note       Primary Care Provider  Frankel, Harry A, MD    Referring Provider   Shira Santillan MD      History of Present Illness   This is a 54 y.o. male presenting with a chief complaint of   Chief Complaint   Patient presents with    Right wrist      Here to consult for PRP on right wrist. MRI completed, showing 2 tears.  Cautious getting steroids, gets  in his left eye.   Pain caused by pressure on wrist, certain motions, typing.         Has injury at [facility]. 1 year ago he think he tweaked it and rested it and was ok. Last year was skiing and fell and FOOSHed and had lots of pain. Had PRP on hamstring tendon and asked about PRP. Has an eye condition but concerned with cortisone bc its triggered it in the past. Feels pain while extending wrist and putting pressure on it. Hgih fiving can hurt. Feels it doresally over SL joint. Takes celebrex for his back but also has torn RTC in R shoulder. Also has hip torn labrum.     No past medical history on file.  No past surgical history on file.  Current Outpatient Medications on File Prior to Visit   Medication Sig Dispense Refill    celecoxib (celeBREX) 200 mg capsule Take 200 mg by mouth daily.      celecoxib (celeBREX) 200 mg capsule Take 1 capsule (200 mg total) by mouth 2 (two) times a day. 180 capsule 1     No current facility-administered medications on file prior to visit.     Allergies   Allergen Reactions    No Known Allergies      Family History   Problem Relation Name Age of Onset    Stroke Biological Mother      Atrial fibrillation Biological Father         PHYSICAL EXAM:   There were no vitals filed for this visit.  GENERAL: NAD, comfortable, WDWN   RESP: Unlabored breathing   MSK:   Right WRIST     INSPECTION:   no gross deformity, no swelling noted on dorsal or volar aspects     PALPATION:    TTP carpal bones  No TTP fovea    ROM:   Full ROM flexion   Full ROM extension   Full ROM supination    Full ROM pronation    Full ROM radial deviation   Full ROM ulnar deviation     STRENGTH:  5/5 flexion   5/5 extension   5/5 supination   5/5 pronation   5/5 radial deviation    5/5 ulnar deviation     NEUROVASCULAR:  sensory to light touch intact in medial, ulnar and radial distributions. Normal cap refill    SPECIAL TESTS:  Neg ulnar compression/TFC compression       IMAGING:   The following images were independently reviewed by myself and discussed with patient   MRI WRIST RIGHT WITHOUT CONTRAST  Narrative: CLINICAL HISTORY:  M25.531: Pain in right wrist. Chronic right wrist pain.    COMMENT:    Comparison: Right wrist radiographs 10/4/2024    Technique: Multiplanar MR examination of the right wrist was performed without  intravenous contrast.    Findings:  SOFT TISSUES: There is mild dorsal deep soft tissue edema.    JOINTS: Carpal arc alignment is anatomic. No evidence of acute fracture. There  is no significant joint effusion.  There is mild joint space narrowing and  spurring at the first carpometacarpal joint.    LIGAMENTS:  Triangular Fibrocartilage Complex: There is a focal perforation type tear at the  central triangular fibrocartilage complex.  Scapholunate: There is a tear of the dorsal bundle of the scapholunate ligament.  Lunotriquetral: Intact.    CARPAL TUNNEL: The flexor tendons and median nerve are unremarkable.    EXTENSOR TENDONS: The extensor tendons of the wrist are intact.    GUYON'S CANAL: Unremarkable.  Impression: IMPRESSION:  1.  Tear of the dorsal bundle of the scapholunate ligament with mild soft tissue  edema.  2.  Focal perforation type tear at the central triangle fibrocartilage complex.  3.  Mild degenerative changes at the first carpometacarpal joint.       PROCEDURES:   none     ASSESSMENT/PLAN:   This is a 54 y.o. male p/f scapholunate ligament injury and discussion of PRP. Has had eye worsening with CSI in past so is weary to do. We discussed that PRP would be a reasonable option. Will get him  scheduled.     Scapholunate ligament injury with no instability, initial encounter  (primary encounter diagnosis)        Follow up: No follow-ups on file.    Diagnosis and treatment options were discussed in detail with the patient who is in agreement with the plan.       Patient/Caregiver verbalizes understanding of teaching and instructions     F/U:  See instructions     Shira Santillan MD   [facility]

## 2024-11-20 ENCOUNTER — HOSPITAL ENCOUNTER (OUTPATIENT)
Dept: RADIOLOGY | Age: 54
Discharge: HOME | End: 2024-11-20
Attending: OTOLARYNGOLOGY
Payer: COMMERCIAL

## 2024-11-20 DIAGNOSIS — J35.01 CHRONIC TONSILLITIS: ICD-10-CM

## 2024-11-20 PROCEDURE — 63600105 HC IODINE BASED CONTRAST: Mod: JW | Performed by: OTOLARYNGOLOGY

## 2024-11-20 PROCEDURE — 70491 CT SOFT TISSUE NECK W/DYE: CPT

## 2024-11-20 RX ORDER — IOPAMIDOL 755 MG/ML
75 INJECTION, SOLUTION INTRAVASCULAR
Status: COMPLETED | OUTPATIENT
Start: 2024-11-20 | End: 2024-11-20

## 2024-11-20 RX ORDER — IOPAMIDOL 755 MG/ML
75 INJECTION, SOLUTION INTRAVASCULAR
Status: DISCONTINUED | OUTPATIENT
Start: 2024-11-20 | End: 2024-11-21 | Stop reason: HOSPADM

## 2024-11-20 RX ADMIN — IOPAMIDOL 75 ML: 755 INJECTION, SOLUTION INTRAVENOUS at 11:21

## 2024-11-20 NOTE — PROGRESS NOTES
"[facility]   [id number]    Your doctor has referred you for a CT examination that requires the injection of an iodinated contrast material into your bloodstream. This iodinated contrast material, sometimes referred to as \"x-ray dye\" allows for better interpretation of the x-ray films or CT images and results in a more accurate interpretation of the examination.     Without the use of iodinated contrast (x-ray dye), the examination may be less informative and result in a suboptimal examination, and possibly a delay in diagnosis and, if needed, treatment.     The iodinated contrast material is given through a small needle or catheter placed into a vein, usually on the inside of the elbow, on the back of hand, or in a vein in the foot or lower leg.    The most common, though still rare, potential reaction to an intravenous contrast injection is an allergic-like reaction. Most allergic-like reactions are mild, though a small subset of people can have more severe reactions. Mild reactions include mild / scattered hives, itching, scratchy throat, sneezing and nasal congestion. More severe reactions include facial swelling, severe difficulty breathing, wheezing and anaphylactic shock. Those with a prior history allergic-like reaction to the same class of contrast media (such as CT contrast or MRI contrast) are at the highest risk for an allergic reaction.     If you believe you had an allergic reaction to contrast in the past, please let our staff know. We can determine if this increases your risk for a future reaction and provide steps to decrease the risk. This may delay your examination, but it decreases the risk of having a new and possibly more severe reaction to the contrast injection.    People with a history of prior allergic reactions to other substances (such as unrelated medications and food) and patients with a history of asthma have slightly increased risk for an allergic reaction from contrast " material when compared with the general population, but do not require to be pretreated prior to a contrast injection.    You should notify the physician, nurse or technologist if you have ever had any of these conditions or if you have any questions.

## 2024-11-27 NOTE — PROGRESS NOTES
PRP Injection    Indication: Pain    Procedure: Sonographically guided R scapholunate joint PRP injection    Informed Consent: Following denial of allergy and review of potential side effects and complications including, but not limited to, infection, allergic reaction, local tissue breakdown, injury to soft tissue and/or nerves, the patient indicated understanding and agreed to proceed.    Right antecubital fossa was cleaned. An 18-gauge needle was then used to draw 60 cc of blood and a 60 cc syringe. This syringe contained 10 cc of anticoagulant. This was taken to a centrifuge and spun down to yield 3 cc of platelet rich plasma.    Procedural pause conducted to verify: correct patient identity, procedure to be performed and, as applicable, correct side and site, correct patient position, availability of any special equipment or other special requirements.    Justification for use of ultrasound guidance: The use of direct sonographic visualization of the needle was required to ensure accurate injection placement for diagnostic specificity, to maximize clinical efficacy and for safety purposes to minimize risk of bleeding or injury to nearby neurovascular structures.     Technique: The procedure was carried out under sterile technique utilizing a sterile ultrasound transducer cover and sterile ultrasound gel. Pre-procedural scanning was performed to determine optimal needle approach for the procedure. The patient was prepped and draped in the usual sterile fashion.     Patient position: prone    Approach: in plane    Local anesthesia: ethyl chloride spray    Aspiration/Injection: Live sonographic guidance with a 12-5 mHz linear array transducer was used throughout the procedure. Using a 25 gauge 5/8 inch needle, 3 cc of platelet rich plasma were injected.    Post-Procedure Instructions: The patient tolerated the procedure well without complication and was discharged in good condition after a short observation  period. The patient was instructed to avoid submerging the procedure site in water for 48-72 hours. The patient was instructed to contact me with any questions pertaining to the procedure and to inform me of the results of the procedure in approximately 7-10 days, as needed. Questions regarding general management should be directed to the patient's referring provider and the patient should keep all previously scheduled follow up appointments.    Multiple key images were saved.    Impression: Successful sonographically-guided PRP injection

## 2024-11-27 NOTE — PATIENT INSTRUCTIONS
PRP discharge instructions:    Keep the injection site clean, dry, and covered for 24 hours after your procedure. You may shower but dry the area and re-apply a band-aid for the first 24 hours  Ice should be applied to the area of injection for about 20 minutes, every 2-3 hours for the first 24 hours  Do not take anti-inflammatory medications for the next 2 weeks  Most people experience an increase in pain in the first 24-48 hours. Ice often and use Tylenol. You may be given a stronger medication if you are still uncomfortable.   If you are given a brace, crutches, or sling, please use it at all times except when showering or dressing  It is strongly recommended that you have someone drive you home if the procedure was done on your lower extremity  Please avoid strenuous activity until your follow up at 2 weeks  Call the office if you develop drainage from the injection site, notice increasing redness, or you develop a fever more than 100.4 degrees.    If you have any concerns, please reach out to the office immediately at [phone]

## 2024-12-03 ENCOUNTER — OFFICE VISIT (OUTPATIENT)
Dept: ORTHOPEDICS | Facility: CLINIC | Age: 54
End: 2024-12-03

## 2024-12-03 DIAGNOSIS — S63.8X1D TEAR OF RIGHT SCAPHOLUNATE LIGAMENT, SUBSEQUENT ENCOUNTER: Primary | ICD-10-CM

## 2024-12-03 DIAGNOSIS — M25.531 RIGHT WRIST PAIN: ICD-10-CM

## 2024-12-03 PROCEDURE — 0232T NJX PLATELET PLASMA: CPT | Performed by: FAMILY MEDICINE

## 2024-12-03 PROCEDURE — 99999 PR OFFICE/OUTPT VISIT,PROCEDURE ONLY: CPT | Performed by: FAMILY MEDICINE
